# Patient Record
Sex: FEMALE | Race: WHITE | Employment: OTHER | URBAN - METROPOLITAN AREA
[De-identification: names, ages, dates, MRNs, and addresses within clinical notes are randomized per-mention and may not be internally consistent; named-entity substitution may affect disease eponyms.]

---

## 2017-07-17 ENCOUNTER — HOSPITAL ENCOUNTER (OUTPATIENT)
Dept: WOUND CARE | Age: 67
Discharge: HOME | End: 2017-07-17
Payer: MEDICARE

## 2017-07-17 PROCEDURE — 29580 STRAPPING UNNA BOOT: CPT

## 2017-07-21 PROCEDURE — 29580 STRAPPING UNNA BOOT: CPT

## 2017-07-28 PROCEDURE — 97602 WOUND(S) CARE NON-SELECTIVE: CPT

## 2017-07-28 PROCEDURE — 29580 STRAPPING UNNA BOOT: CPT

## 2017-08-04 ENCOUNTER — HOSPITAL ENCOUNTER (OUTPATIENT)
Dept: WOUND CARE | Age: 67
Discharge: HOME | End: 2017-08-04
Payer: MEDICARE

## 2017-08-04 PROCEDURE — 29580 STRAPPING UNNA BOOT: CPT

## 2017-08-11 PROCEDURE — 29580 STRAPPING UNNA BOOT: CPT

## 2017-08-18 PROCEDURE — 29580 STRAPPING UNNA BOOT: CPT

## 2017-08-24 PROCEDURE — 97602 WOUND(S) CARE NON-SELECTIVE: CPT

## 2017-09-01 ENCOUNTER — HOSPITAL ENCOUNTER (OUTPATIENT)
Dept: WOUND CARE | Age: 67
Discharge: HOME | End: 2017-09-01
Payer: MEDICARE

## 2017-09-01 PROCEDURE — 97602 WOUND(S) CARE NON-SELECTIVE: CPT

## 2017-09-08 PROCEDURE — 29580 STRAPPING UNNA BOOT: CPT

## 2017-09-11 PROCEDURE — 29580 STRAPPING UNNA BOOT: CPT

## 2017-09-15 PROCEDURE — 29580 STRAPPING UNNA BOOT: CPT

## 2017-09-18 PROCEDURE — 29580 STRAPPING UNNA BOOT: CPT

## 2017-09-22 PROCEDURE — 29580 STRAPPING UNNA BOOT: CPT

## 2017-09-26 ENCOUNTER — OFFICE VISIT (OUTPATIENT)
Dept: VASCULAR SURGERY | Age: 67
End: 2017-09-26

## 2017-09-26 VITALS
WEIGHT: 293 LBS | SYSTOLIC BLOOD PRESSURE: 140 MMHG | DIASTOLIC BLOOD PRESSURE: 70 MMHG | HEART RATE: 88 BPM | HEIGHT: 62 IN | BODY MASS INDEX: 53.92 KG/M2 | RESPIRATION RATE: 22 BRPM

## 2017-09-26 DIAGNOSIS — M79.89 LEG SWELLING: ICD-10-CM

## 2017-09-26 DIAGNOSIS — E66.01 MORBID OBESITY, UNSPECIFIED OBESITY TYPE (HCC): Primary | ICD-10-CM

## 2017-09-26 DIAGNOSIS — I87.2 VENOUS REFLUX: ICD-10-CM

## 2017-09-26 PROCEDURE — 29580 STRAPPING UNNA BOOT: CPT

## 2017-09-26 RX ORDER — MAGNESIUM 200 MG
1000 TABLET ORAL DAILY
COMMUNITY

## 2017-09-26 RX ORDER — WARFARIN 6 MG/1
6 TABLET ORAL
Status: ON HOLD | COMMUNITY
Start: 2017-05-31 | End: 2017-10-31

## 2017-09-26 RX ORDER — CHOLECALCIFEROL (VITAMIN D3) 125 MCG
2000 CAPSULE ORAL
COMMUNITY

## 2017-09-26 RX ORDER — ALBUTEROL SULFATE 90 UG/1
AEROSOL, METERED RESPIRATORY (INHALATION)
COMMUNITY
Start: 2016-01-25

## 2017-09-26 RX ORDER — METOPROLOL SUCCINATE 25 MG/1
25 TABLET, EXTENDED RELEASE ORAL 2 TIMES DAILY
COMMUNITY
Start: 2016-01-25

## 2017-09-26 RX ORDER — ESCITALOPRAM OXALATE 10 MG/1
TABLET ORAL
COMMUNITY
Start: 2017-07-30

## 2017-09-26 RX ORDER — LANOLIN ALCOHOL/MO/W.PET/CERES
325 CREAM (GRAM) TOPICAL
COMMUNITY
Start: 2016-04-22

## 2017-09-26 NOTE — MR AVS SNAPSHOT
Visit Information Date & Time Provider Department Dept. Phone Encounter #  
 9/26/2017 10:45 AM Katherine Saunders MD [facility] [address]  [id number] Your Appointments 10/18/2017 10:00 AM  
PROCEDURE with [facility] 1 [facility] ([address]) Appt Note: Suhail Reflux Jacky Galvan [place] [address]  
[phone] Padmini Ryan [address]  
  
    
 10/25/2017 10:15 AM  
Follow Up with Katherine Saunders MD  
BS [facility] THE [facility] ([address]) Appt Note: 1 month FU with [address] [place] [address] Upcoming Health Maintenance Date Due Hepatitis C Screening 1950 DTaP/Tdap/Td series (1 - Tdap) 10/29/1970 BREAST CANCER SCRN MAMMOGRAM 10/29/1999 FOBT Q 1 YEAR AGE 50-75 10/29/1999 ZOSTER VACCINE AGE 60> 8/29/2009 GLAUCOMA SCREENING Q2Y 10/29/2014 OSTEOPOROSIS SCREENING (DEXA) 10/29/2014 Pneumococcal 65+ Low/Medium Risk (1 of 2 - PCV13) 10/29/2014 MEDICARE YEARLY EXAM 10/29/2014 INFLUENZA AGE 9 TO ADULT 8/1/2017 Allergies as of 9/26/2017  Review Complete On: 9/26/2017 By: Helene Akhtar RN No Known Allergies Current Immunizations  Never Reviewed No immunizations on file. Not reviewed this visit You Were Diagnosed With   
  
 Codes Comments Morbid obesity, unspecified [facility] type ([facility]    -  Primary ICD-10-CM: E66.01 
ICD-9-CM: 278.01 Venous reflux     ICD-10-CM: I87.2 ICD-9-CM: 459.81 Leg swelling     ICD-10-CM: M79.89 ICD-9-CM: 729.81 Vitals BP Pulse Resp Height(growth percentile) Weight(growth percentile) BMI  
 140/70 (BP 1 Location: Left arm, BP Patient Position: Sitting) 88 22 5' 2\" (1.575 m) 312 lb (141.5 kg) 57.07 kg/m2 OB Status Smoking Status Postmenopausal Never Smoker BMI and BSA Data Body Mass Index Body Surface Area 57.07 kg/m 2 2.49 m 2 Your Updated Medication List  
  
   
This list is accurate as of: 9/26/17 11:39 AM.  Always use your most recent med list.  
  
  
  
  
 cholecalciferol (vitamin D3) 2,000 unit Tab Take 2,000 Units by mouth. COUMADIN 6 mg tablet Generic drug:  warfarin Take 6 mg by mouth.  
  
 escitalopram oxalate 10 mg tablet Commonly known as:  Alok Orts TAKE 1 TABLET (10 MG TOTAL) BY MOUTH DAILY. ferrous sulfate 325 mg (65 mg iron) tablet Take 325 mg by mouth. TOPROL XL 25 mg XL tablet Generic drug:  metoprolol succinate Take 1 Tab by mouth. VENTOLIN HFA 90 mcg/actuation inhaler Generic drug:  albuterol VENTOLIN  (90 Base) MCG/ACT AERS  
  
 VITAMIN B-12 1,000 mcg sublingual tablet Generic drug:  cyanocobalamin Take 1,000 mcg by mouth daily. To-Do List   
 10/10/2017 Imaging:  DUPLEX LOWER EXT VENOUS BILAT AMB Please provide this summary of care documentation to your next provider. Your primary care clinician is listed as Sadaf Steret. If you have any questions after today's visit, please call [phone].

## 2017-09-26 NOTE — PROGRESS NOTES
Steve Alfred    Chief Complaint   Patient presents with    Swelling    Leg Pain       History and Physical    Ms. Malcolm Holly is a 78year old female referred to our office for evaluation of her left leg ulcer. She was being seen in the wound care office and her wound subsequently healed, but during her last visit there was concern about her arterial perfusion. She states that she developed the ulcer on her leg after she broke her skin from scratching. She has never worn compression, although wound care has been placing her in Unna boots for compression. She admits to bilateral leg swelling and has been dealing with cellulitis in her right leg for some time. She also has a history of a DVT but is not quite sure which leg it was in. She states now her left leg wound is healed. Past Medical History:   Diagnosis Date    Depression     Hypertension     Thromboembolus [facility])      Patient Active Problem List   Diagnosis Code    Morbid obesity ([facility] E66.01    Venous reflux I87.2    Leg swelling M79.89     Past Surgical History:   Procedure Laterality Date    ABDOMEN SURGERY PROC UNLISTED      HX CHOLECYSTECTOMY      HX ORTHOPAEDIC       Current Outpatient Prescriptions   Medication Sig Dispense Refill    warfarin (COUMADIN) 6 mg tablet Take 6 mg by mouth.  escitalopram oxalate (LEXAPRO) 10 mg tablet TAKE 1 TABLET (10 MG TOTAL) BY MOUTH DAILY.  ferrous sulfate 325 mg (65 mg iron) tablet Take 325 mg by mouth.  metoprolol succinate (TOPROL XL) 25 mg XL tablet Take 1 Tab by mouth.  cholecalciferol, vitamin D3, 2,000 unit tab Take 2,000 Units by mouth.  albuterol (VENTOLIN HFA) 90 mcg/actuation inhaler VENTOLIN  (90 Base) MCG/ACT AERS      cyanocobalamin (VITAMIN B-12) 1,000 mcg sublingual tablet Take 1,000 mcg by mouth daily.        No Known Allergies  Social History     Social History    Marital status:      Spouse name: N/A    Number of children: N/A    Years of education: N/A     Occupational History    Not on file. Social History Main Topics    Smoking status: Never Smoker    Smokeless tobacco: Never Used    Alcohol use Yes    Drug use: No    Sexual activity: Not Currently     Other Topics Concern    Not on file     Social History Narrative    No narrative on file      Family History   Problem Relation Age of Onset    Diabetes Mother     Hypertension Mother     Cancer Father     Heart Disease Father        Review of Systems    Review of Systems   Constitutional: Negative for chills, diaphoresis, fever, malaise/fatigue and weight loss. HENT: Negative for hearing loss and sore throat. Eyes: Negative for blurred vision, photophobia and redness. Respiratory: Negative for cough, hemoptysis, shortness of breath and wheezing. Cardiovascular: Positive for leg swelling. Negative for chest pain, palpitations and orthopnea. Gastrointestinal: Negative for abdominal pain, blood in stool, constipation, diarrhea, heartburn, nausea and vomiting. Genitourinary: Negative for dysuria, frequency, hematuria and urgency. Musculoskeletal: Negative for back pain and myalgias. Skin: Positive for itching. Negative for rash. Neurological: Negative for dizziness, speech change, focal weakness, weakness and headaches. Endo/Heme/Allergies: Does not bruise/bleed easily. Psychiatric/Behavioral: Negative for depression and suicidal ideas.             Physical Exam:    Visit Vitals    /70 (BP 1 Location: Left arm, BP Patient Position: Sitting)    Pulse 88    Resp 22    Ht 5' 2\" (1.575 m)    Wt 312 lb (141.5 kg)    BMI 57.07 kg/m2      Physical Examination: General appearance - alert, well appearing, and in no distress  Mental status - alert, oriented to person, place, and time  Eyes - sclera anicteric, left eye normal, right eye normal  Ears - right ear normal, left ear normal  Nose - normal and patent, no erythema, discharge or polyps  Mouth - mucous membranes moist, pharynx normal without lesions  Neck - supple, no significant adenopathy  Lymphatics - no palpable lymphadenopathy  Chest - clear to auscultation, no wheezes, rales or rhonchi, symmetric air entry  Heart - normal rate and regular rhythm  Abdomen - Obese, soft, non tender  Extremities - 2+ pitting edema RLE.  + cellulitis. Left leg without any visible ulcers. 1+ pitting edema. Impression and Plan:    ICD-10-CM ICD-9-CM    1. Morbid obesity, unspecified obesity type ([facility] E66.01 278.01 DUPLEX LOWER EXT VENOUS BILAT AMB   2. Venous reflux I87.2 459.81 DUPLEX LOWER EXT VENOUS BILAT AMB   3. Leg swelling M79.89 729.81 DUPLEX LOWER EXT VENOUS BILAT AMB     Orders Placed This Encounter    DUPLEX LOWER EXT VENOUS BILAT AMB (Reflux)    warfarin (COUMADIN) 6 mg tablet    escitalopram oxalate (LEXAPRO) 10 mg tablet    ferrous sulfate 325 mg (65 mg iron) tablet    metoprolol succinate (TOPROL XL) 25 mg XL tablet    cholecalciferol, vitamin D3, 2,000 unit tab    albuterol (VENTOLIN HFA) 90 mcg/actuation inhaler    cyanocobalamin (VITAMIN B-12) 1,000 mcg sublingual tablet     I told Ms. Noman Ernst that she most likely has a combination of lymphedema and venous reflux. Both of these issues are worsened by her pannus compressing her IVC and decreasing her venous return by increasing her venous pressure. I explained that without weight loss, her leg issues will be a life long problem. We will study her for venous reflux and have recommended compression stockings. Ms. Noman Ernst has agreed and will see us in 3 weeks to review her results and to discuss her treatment options. Follow-up Disposition:  Return in about 3 weeks (around 10/17/2017) for Vascular labs. The treatment plan was reviewed with the patient in detail. The patient voiced understanding of this plan and all questions and concerns were addressed. The patient agrees with this plan.   We discussed the signs and symptoms that would require earlier attention or intervention. The patient was given educational material related to his/her visit and the patient has voiced understanding of the material.     I appreciate the opportunity to participate in the care of your patient. I will be sure to keep you informed of any subsequent changes in the treatment plan. If you have any questions or concerns, please feel free to contact me. Stephanie Robison MD    PLEASE NOTE:  This document has been produced using voice recognition software. Unrecognized errors in transcription may be present.

## 2017-09-29 PROCEDURE — 29580 STRAPPING UNNA BOOT: CPT

## 2017-10-03 ENCOUNTER — HOSPITAL ENCOUNTER (OUTPATIENT)
Dept: WOUND CARE | Age: 67
Discharge: HOME | End: 2017-10-03
Payer: MEDICARE

## 2017-10-03 PROCEDURE — 29580 STRAPPING UNNA BOOT: CPT

## 2017-10-06 PROCEDURE — 97602 WOUND(S) CARE NON-SELECTIVE: CPT

## 2017-10-16 PROCEDURE — 29580 STRAPPING UNNA BOOT: CPT

## 2017-10-18 ENCOUNTER — OFFICE VISIT (OUTPATIENT)
Dept: VASCULAR SURGERY | Age: 67
End: 2017-10-18

## 2017-10-18 DIAGNOSIS — E66.01 MORBID OBESITY, UNSPECIFIED OBESITY TYPE (HCC): ICD-10-CM

## 2017-10-18 DIAGNOSIS — M79.89 LEG SWELLING: ICD-10-CM

## 2017-10-18 DIAGNOSIS — I87.2 VENOUS REFLUX: ICD-10-CM

## 2017-10-18 NOTE — PROCEDURES
[facility]   *** FINAL REPORT ***    Name: Dorothey Gilford  MRN: [id number]       Outpatient  : 29 Oct 1950  HIS Order #: [id number]  [address] [facility] Visit #: [id number]  Date: 18 Oct 2017    TYPE OF TEST: Peripheral Venous Testing    REASON FOR TEST  Edema    Right Leg:-  Deep venous thrombosis:           No  Superficial venous thrombosis:    No  Deep venous insufficiency:        Yes  Superficial venous insufficiency: No    Left Leg:-  Deep venous thrombosis:           No  Superficial venous thrombosis:    No  Deep venous insufficiency:        No  Superficial venous insufficiency: No      INTERPRETATION/FINDINGS  Duplex images were obtained using 2-D gray scale, color flow and  spectral doppler analysis. Technically difficult exam -morbidly obese. Could not position manual  bed in reverse trendelenberg due to patients weight. 1. No evidence of deep vein thrombosis in the common femoral proximal  deep femoral, femoral, popliteal and posterior tibial veins. 2. Peroneal veins not visualized bilaterally. Right popliteal vein  patent by doppler and colorflow, poorly visualized in gray scale. Distal  femoral veins poorly visualized. 3. Deep venous reflux in the right common femoral vein of 2.55  seconds. 4. No evidence of great saphenous vein reflux bilaterally from the  sapheno-femoral junction to proximal calf. 5. No evidence of small saphenous vein reflux bilaterally in the  proximal, mid and distal segments. 6. Interstitial edema identified in both calves. 7. Triphasic posterior tibial artery flow bilaterally. ADDITIONAL COMMENTS    I have personally reviewed the data relevant to the interpretation of  this  study. TECHNOLOGIST: Armida Munguia RVT, RDMS  Signed: 10/18/2017 02:22 PM    PHYSICIAN: Carloz Campa.  Elizabeth Chaudhary MD  Signed: 10/18/2017 02:42 PM

## 2017-10-19 PROCEDURE — 97602 WOUND(S) CARE NON-SELECTIVE: CPT

## 2017-10-23 PROCEDURE — 97602 WOUND(S) CARE NON-SELECTIVE: CPT

## 2017-10-25 ENCOUNTER — OFFICE VISIT (OUTPATIENT)
Dept: VASCULAR SURGERY | Age: 67
End: 2017-10-25

## 2017-10-25 VITALS
SYSTOLIC BLOOD PRESSURE: 136 MMHG | WEIGHT: 293 LBS | HEART RATE: 90 BPM | BODY MASS INDEX: 53.92 KG/M2 | RESPIRATION RATE: 18 BRPM | DIASTOLIC BLOOD PRESSURE: 80 MMHG | HEIGHT: 62 IN

## 2017-10-25 DIAGNOSIS — I89.0 LYMPHEDEMA: ICD-10-CM

## 2017-10-25 DIAGNOSIS — E66.01 MORBID OBESITY (HCC): Primary | ICD-10-CM

## 2017-10-25 DIAGNOSIS — M79.89 LEG SWELLING: ICD-10-CM

## 2017-10-25 NOTE — MR AVS SNAPSHOT
Visit Information Date & Time Provider Department Dept. Phone Encounter #  
 10/25/2017 10:15 AM Tiki Clark MD [facility] [address] [id number] Your Appointments 11/28/2017  9:00 AM  
Follow Up with Tiki Clark MD  
[facility] Appt Note: 1 month FU no studies [address] [place] [address] Upcoming Health Maintenance Date Due Hepatitis C Screening 1950 DTaP/Tdap/Td series (1 - Tdap) 10/29/1970 BREAST CANCER SCRN MAMMOGRAM 10/29/1999 FOBT Q 1 YEAR AGE 50-75 10/29/1999 ZOSTER VACCINE AGE 60> 8/29/2009 GLAUCOMA SCREENING Q2Y 10/29/2014 OSTEOPOROSIS SCREENING (DEXA) 10/29/2014 Pneumococcal 65+ Low/Medium Risk (1 of 2 - PCV13) 10/29/2014 MEDICARE YEARLY EXAM 10/29/2014 INFLUENZA AGE 9 TO ADULT 8/1/2017 Allergies as of 10/25/2017  Review Complete On: 10/25/2017 By: Morteza Johnson RN No Known Allergies Current Immunizations  Never Reviewed No immunizations on file. Not reviewed this visit Vitals BP Pulse Resp Height(growth percentile) Weight(growth percentile) BMI  
 136/80 (BP 1 Location: Right arm, BP Patient Position: Sitting) 90 18 5' 2\" (1.575 m) 312 lb (141.5 kg) 57.07 kg/m2 OB Status Smoking Status Postmenopausal Never Smoker BMI and BSA Data Body Mass Index Body Surface Area 57.07 kg/m 2 2.49 m 2 Your Updated Medication List  
  
   
This list is accurate as of: 10/25/17 10:56 AM.  Always use your most recent med list.  
  
  
  
  
 cholecalciferol (vitamin D3) 2,000 unit Tab Take 2,000 Units by mouth. COUMADIN 6 mg tablet Generic drug:  warfarin Take 6 mg by mouth.  
  
 escitalopram oxalate 10 mg tablet Commonly known as:  Skip  TAKE 1 TABLET (10 MG TOTAL) BY MOUTH DAILY. ferrous sulfate 325 mg (65 mg iron) tablet Take 325 mg by mouth. TOPROL XL 25 mg XL tablet Generic drug:  metoprolol succinate Take 1 Tab by mouth. VENTOLIN HFA 90 mcg/actuation inhaler Generic drug:  albuterol VENTOLIN  (90 Base) MCG/ACT AERS  
  
 VITAMIN B-12 1,000 mcg sublingual tablet Generic drug:  cyanocobalamin Take 1,000 mcg by mouth daily. To-Do List   
 11/02/2017 9:00 AM  
  Appointment with WOUND NURSE at [facility] ([phone]) Please provide this summary of care documentation to your next provider. Your primary care clinician is listed as Nerissa Narvaez. If you have any questions after today's visit, please call [phone].

## 2017-10-26 NOTE — PROGRESS NOTES
Lisa Nurse    Chief Complaint   Patient presents with    Leg Pain    Swelling       History and Physical    Ms. Nunu Chowdhury returns to our office for follow up of her bilateral leg swelling and possible venous reflux disease. She states that she is continuing to struggle with her wound by her knee and is scheduled to see Dr. Tiff Romo in the wound care office this Friday. She denies any fevers or chills, new wounds or episodes of cellulitis. Past Medical History:   Diagnosis Date    Depression     Hypertension     Thromboembolus [facility])      Patient Active Problem List   Diagnosis Code    Morbid obesity ([facility] E66.01    Venous reflux I87.2    Leg swelling M79.89     Past Surgical History:   Procedure Laterality Date    ABDOMEN SURGERY PROC UNLISTED      HX CHOLECYSTECTOMY      HX ORTHOPAEDIC       Current Outpatient Prescriptions   Medication Sig Dispense Refill    warfarin (COUMADIN) 6 mg tablet Take 6 mg by mouth.  escitalopram oxalate (LEXAPRO) 10 mg tablet TAKE 1 TABLET (10 MG TOTAL) BY MOUTH DAILY.  ferrous sulfate 325 mg (65 mg iron) tablet Take 325 mg by mouth.  metoprolol succinate (TOPROL XL) 25 mg XL tablet Take 1 Tab by mouth.  cholecalciferol, vitamin D3, 2,000 unit tab Take 2,000 Units by mouth.  albuterol (VENTOLIN HFA) 90 mcg/actuation inhaler VENTOLIN  (90 Base) MCG/ACT AERS      cyanocobalamin (VITAMIN B-12) 1,000 mcg sublingual tablet Take 1,000 mcg by mouth daily. No Known Allergies  Social History     Social History    Marital status:      Spouse name: N/A    Number of children: N/A    Years of education: N/A     Occupational History    Not on file.      Social History Main Topics    Smoking status: Never Smoker    Smokeless tobacco: Never Used    Alcohol use Yes    Drug use: No    Sexual activity: Not Currently     Other Topics Concern    Not on file     Social History Narrative      Family History   Problem Relation Age of Onset    Diabetes Mother     Hypertension Mother     Cancer Father     Heart Disease Father        Review of Systems    Review of Systems   Constitutional: Negative for chills, diaphoresis, fever, malaise/fatigue and weight loss. HENT: Negative for hearing loss and sore throat. Eyes: Negative for blurred vision, photophobia and redness. Respiratory: Negative for cough, hemoptysis, shortness of breath and wheezing. Cardiovascular: Positive for leg swelling. Negative for chest pain, palpitations and orthopnea. Gastrointestinal: Negative for abdominal pain, blood in stool, constipation, diarrhea, heartburn, nausea and vomiting. Genitourinary: Negative for dysuria, frequency, hematuria and urgency. Musculoskeletal: Negative for back pain and myalgias. Skin: Negative for itching and rash. Neurological: Negative for dizziness, speech change, focal weakness, weakness and headaches. Endo/Heme/Allergies: Does not bruise/bleed easily. Psychiatric/Behavioral: Negative for depression and suicidal ideas. Physical Exam:    Visit Vitals    /80 (BP 1 Location: Right arm, BP Patient Position: Sitting)    Pulse 90    Resp 18    Ht 5' 2\" (1.575 m)    Wt 312 lb (141.5 kg)    BMI 57.07 kg/m2      Physical Examination: General appearance - alert, well appearing, and in no distress. Morbidly obese  Mental status - alert, oriented to person, place, and time  Eyes - sclera anicteric, left eye normal, right eye normal  Ears - right ear normal, left ear normal  Nose - normal and patent, no erythema, discharge or polyps  Mouth - mucous membranes moist, pharynx normal without lesions  Neck - supple, no significant adenopathy  Extremities - Left knee in heavy bandage. Left lower leg with Unna boot. Right leg with persistent lymphedema. No active cellulitis      Impression and Plan:    ICD-10-CM ICD-9-CM    1. Morbid obesity ([facility] E66.01 278.01    2. Leg swelling M79.89 729.81    3.  Lymphedema I89.0 457.1      I reviewed Ms. Weathers's venous reflux study and this is negative. I explained that she has lymphedema as her primary cause of her leg swelling and episodes of cellulitis and wounds. I believe her biggest issue is her weight and we discussed this. Ms. Vijay Anderson needs to wear bilateral compression stockings for the rest of her life, and would benefit from lymphedema therapy. However, this may have to wait until her knee wound is healed. Additionally, I think that the Unna boot stopping at her mid leg may lead to increased swelling at her knee which may impede her wound healing. We will re evaluate Ms. Vijay Anderson again in a month and try to get her on a regimen to keep her edema under control. Follow-up Disposition:  Return in about 4 weeks (around 11/22/2017) for Symptom check. The treatment plan was reviewed with the patient in detail. The patient voiced understanding of this plan and all questions and concerns were addressed. The patient agrees with this plan. We discussed the signs and symptoms that would require earlier attention or intervention. The patient was given educational material related to his/her visit and the patient has voiced understanding of the material.     I appreciate the opportunity to participate in the care of your patient. I will be sure to keep you informed of any subsequent changes in the treatment plan. If you have any questions or concerns, please feel free to contact me. Sahil Melton MD    PLEASE NOTE:  This document has been produced using voice recognition software. Unrecognized errors in transcription may be present.

## 2017-10-27 ENCOUNTER — HOSPITAL ENCOUNTER (INPATIENT)
Age: 67
LOS: 3 days | Discharge: HOME | DRG: 603 | End: 2017-10-31
Attending: FAMILY MEDICINE | Admitting: HOSPITALIST
Payer: MEDICARE

## 2017-10-27 ENCOUNTER — APPOINTMENT (OUTPATIENT)
Dept: GENERAL RADIOLOGY | Age: 67
DRG: 603 | End: 2017-10-27
Attending: FAMILY MEDICINE
Payer: MEDICARE

## 2017-10-27 DIAGNOSIS — L03.116 CELLULITIS OF LEFT KNEE: Primary | ICD-10-CM

## 2017-10-27 PROBLEM — T14.8XXA NONHEALING NONSURGICAL WOUND: Status: ACTIVE | Noted: 2017-10-27

## 2017-10-27 PROBLEM — T14.8XXA NONHEALING NONSURGICAL WOUND: Status: RESOLVED | Noted: 2017-10-27 | Resolved: 2017-10-27

## 2017-10-27 PROBLEM — Z79.01 ANTICOAGULATED ON COUMADIN: Status: ACTIVE | Noted: 2017-10-27

## 2017-10-27 PROBLEM — L02.416 ABSCESS OF KNEE, LEFT: Status: ACTIVE | Noted: 2017-10-27

## 2017-10-27 PROBLEM — Z86.711 HISTORY OF PULMONARY EMBOLISM: Status: ACTIVE | Noted: 2017-10-27

## 2017-10-27 LAB
ANION GAP SERPL CALC-SCNC: 9 MMOL/L (ref 3–18)
BASOPHILS # BLD: 0 K/UL (ref 0–0.06)
BASOPHILS NFR BLD: 1 % (ref 0–2)
BUN SERPL-MCNC: 11 MG/DL (ref 7–18)
BUN/CREAT SERPL: 13 (ref 12–20)
CALCIUM SERPL-MCNC: 8.9 MG/DL (ref 8.5–10.1)
CHLORIDE SERPL-SCNC: 104 MMOL/L (ref 100–108)
CO2 SERPL-SCNC: 26 MMOL/L (ref 21–32)
CREAT SERPL-MCNC: 0.82 MG/DL (ref 0.6–1.3)
DIFFERENTIAL METHOD BLD: ABNORMAL
EOSINOPHIL # BLD: 0.2 K/UL (ref 0–0.4)
EOSINOPHIL NFR BLD: 3 % (ref 0–5)
ERYTHROCYTE [DISTWIDTH] IN BLOOD BY AUTOMATED COUNT: 16.3 % (ref 11.6–14.5)
GLUCOSE SERPL-MCNC: 105 MG/DL (ref 74–99)
HCT VFR BLD AUTO: 36.9 % (ref 35–45)
HGB BLD-MCNC: 11.5 G/DL (ref 12–16)
INR PPP: 2.4 (ref 0.8–1.2)
LYMPHOCYTES # BLD: 1.4 K/UL (ref 0.9–3.6)
LYMPHOCYTES NFR BLD: 23 % (ref 21–52)
MCH RBC QN AUTO: 26 PG (ref 24–34)
MCHC RBC AUTO-ENTMCNC: 31.2 G/DL (ref 31–37)
MCV RBC AUTO: 83.5 FL (ref 74–97)
MONOCYTES # BLD: 0.4 K/UL (ref 0.05–1.2)
MONOCYTES NFR BLD: 6 % (ref 3–10)
NEUTS SEG # BLD: 4 K/UL (ref 1.8–8)
NEUTS SEG NFR BLD: 67 % (ref 40–73)
PLATELET # BLD AUTO: 255 K/UL (ref 135–420)
PMV BLD AUTO: 9.8 FL (ref 9.2–11.8)
POTASSIUM SERPL-SCNC: 4.4 MMOL/L (ref 3.5–5.5)
PROTHROMBIN TIME: 25.2 SEC (ref 11.5–15.2)
RBC # BLD AUTO: 4.42 M/UL (ref 4.2–5.3)
SODIUM SERPL-SCNC: 139 MMOL/L (ref 136–145)
WBC # BLD AUTO: 6.1 K/UL (ref 4.6–13.2)

## 2017-10-27 PROCEDURE — 74011250636 HC RX REV CODE- 250/636: Performed by: FAMILY MEDICINE

## 2017-10-27 PROCEDURE — 85025 COMPLETE CBC W/AUTO DIFF WBC: CPT | Performed by: FAMILY MEDICINE

## 2017-10-27 PROCEDURE — 99283 EMERGENCY DEPT VISIT LOW MDM: CPT

## 2017-10-27 PROCEDURE — 87186 SC STD MICRODIL/AGAR DIL: CPT | Performed by: PODIATRIST

## 2017-10-27 PROCEDURE — 80048 BASIC METABOLIC PNL TOTAL CA: CPT | Performed by: FAMILY MEDICINE

## 2017-10-27 PROCEDURE — L3670 SO ACRO/CLAV CAN WEB PRE OTS: HCPCS

## 2017-10-27 PROCEDURE — 74011250636 HC RX REV CODE- 250/636: Performed by: EMERGENCY MEDICINE

## 2017-10-27 PROCEDURE — 87040 BLOOD CULTURE FOR BACTERIA: CPT | Performed by: FAMILY MEDICINE

## 2017-10-27 PROCEDURE — 74011250636 HC RX REV CODE- 250/636: Performed by: PHYSICIAN ASSISTANT

## 2017-10-27 PROCEDURE — 96368 THER/DIAG CONCURRENT INF: CPT

## 2017-10-27 PROCEDURE — 87077 CULTURE AEROBIC IDENTIFY: CPT | Performed by: PODIATRIST

## 2017-10-27 PROCEDURE — 87070 CULTURE OTHR SPECIMN AEROBIC: CPT | Performed by: PODIATRIST

## 2017-10-27 PROCEDURE — 97602 WOUND(S) CARE NON-SELECTIVE: CPT

## 2017-10-27 PROCEDURE — 87075 CULTR BACTERIA EXCEPT BLOOD: CPT | Performed by: PODIATRIST

## 2017-10-27 PROCEDURE — 74011000258 HC RX REV CODE- 258: Performed by: EMERGENCY MEDICINE

## 2017-10-27 PROCEDURE — 87076 CULTURE ANAEROBE IDENT EACH: CPT | Performed by: PODIATRIST

## 2017-10-27 PROCEDURE — 74011000250 HC RX REV CODE- 250: Performed by: PHYSICIAN ASSISTANT

## 2017-10-27 PROCEDURE — 74011000258 HC RX REV CODE- 258: Performed by: FAMILY MEDICINE

## 2017-10-27 PROCEDURE — 77030011256 HC DRSG MEPILEX <16IN NO BORD MOLN -A

## 2017-10-27 PROCEDURE — 74011250637 HC RX REV CODE- 250/637: Performed by: PHYSICIAN ASSISTANT

## 2017-10-27 PROCEDURE — 96367 TX/PROPH/DG ADDL SEQ IV INF: CPT

## 2017-10-27 PROCEDURE — 73564 X-RAY EXAM KNEE 4 OR MORE: CPT

## 2017-10-27 PROCEDURE — 85610 PROTHROMBIN TIME: CPT | Performed by: EMERGENCY MEDICINE

## 2017-10-27 PROCEDURE — 74011000258 HC RX REV CODE- 258: Performed by: PHYSICIAN ASSISTANT

## 2017-10-27 PROCEDURE — 99218 HC RM OBSERVATION: CPT

## 2017-10-27 PROCEDURE — 96365 THER/PROPH/DIAG IV INF INIT: CPT

## 2017-10-27 RX ORDER — PIPERACILLIN SODIUM, TAZOBACTAM SODIUM 3; .375 G/15ML; G/15ML
3.38 INJECTION, POWDER, LYOPHILIZED, FOR SOLUTION INTRAVENOUS
Status: DISCONTINUED | OUTPATIENT
Start: 2017-10-27 | End: 2017-10-27 | Stop reason: CLARIF

## 2017-10-27 RX ORDER — MELATONIN
2000 DAILY
Status: DISCONTINUED | OUTPATIENT
Start: 2017-10-28 | End: 2017-10-31 | Stop reason: HOSPADM

## 2017-10-27 RX ORDER — WARFARIN 6 MG/1
6 TABLET ORAL ONCE
Status: COMPLETED | OUTPATIENT
Start: 2017-10-27 | End: 2017-10-27

## 2017-10-27 RX ORDER — SODIUM CHLORIDE 0.9 % (FLUSH) 0.9 %
5-10 SYRINGE (ML) INJECTION AS NEEDED
Status: DISCONTINUED | OUTPATIENT
Start: 2017-10-27 | End: 2017-10-31 | Stop reason: HOSPADM

## 2017-10-27 RX ORDER — SODIUM CHLORIDE 0.9 % (FLUSH) 0.9 %
5-10 SYRINGE (ML) INJECTION EVERY 8 HOURS
Status: DISCONTINUED | OUTPATIENT
Start: 2017-10-27 | End: 2017-10-31 | Stop reason: HOSPADM

## 2017-10-27 RX ORDER — HYDROCODONE BITARTRATE AND ACETAMINOPHEN 5; 325 MG/1; MG/1
1 TABLET ORAL
Status: DISCONTINUED | OUTPATIENT
Start: 2017-10-27 | End: 2017-10-31 | Stop reason: HOSPADM

## 2017-10-27 RX ORDER — LIDOCAINE HYDROCHLORIDE 20 MG/ML
INJECTION, SOLUTION INFILTRATION; PERINEURAL
Status: DISPENSED
Start: 2017-10-27 | End: 2017-10-28

## 2017-10-27 RX ORDER — LANOLIN ALCOHOL/MO/W.PET/CERES
1 CREAM (GRAM) TOPICAL
Status: DISCONTINUED | OUTPATIENT
Start: 2017-10-28 | End: 2017-10-31 | Stop reason: HOSPADM

## 2017-10-27 RX ORDER — LANOLIN ALCOHOL/MO/W.PET/CERES
1000 CREAM (GRAM) TOPICAL DAILY
Status: DISCONTINUED | OUTPATIENT
Start: 2017-10-28 | End: 2017-10-31 | Stop reason: HOSPADM

## 2017-10-27 RX ORDER — ESCITALOPRAM OXALATE 10 MG/1
10 TABLET ORAL DAILY
Status: DISCONTINUED | OUTPATIENT
Start: 2017-10-28 | End: 2017-10-31 | Stop reason: HOSPADM

## 2017-10-27 RX ORDER — SODIUM CHLORIDE 9 MG/ML
50 INJECTION, SOLUTION INTRAVENOUS CONTINUOUS
Status: DISCONTINUED | OUTPATIENT
Start: 2017-10-27 | End: 2017-10-31 | Stop reason: HOSPADM

## 2017-10-27 RX ORDER — VANCOMYCIN HYDROCHLORIDE
1250 ONCE
Status: COMPLETED | OUTPATIENT
Start: 2017-10-27 | End: 2017-10-28

## 2017-10-27 RX ORDER — ACETAMINOPHEN 325 MG/1
650 TABLET ORAL
Status: DISCONTINUED | OUTPATIENT
Start: 2017-10-27 | End: 2017-10-31 | Stop reason: HOSPADM

## 2017-10-27 RX ORDER — ALBUTEROL SULFATE 90 UG/1
1 AEROSOL, METERED RESPIRATORY (INHALATION)
Status: DISCONTINUED | OUTPATIENT
Start: 2017-10-27 | End: 2017-10-27 | Stop reason: SDUPTHER

## 2017-10-27 RX ORDER — WARFARIN 6 MG/1
6 TABLET ORAL DAILY
Status: DISCONTINUED | OUTPATIENT
Start: 2017-10-28 | End: 2017-10-27 | Stop reason: DRUGHIGH

## 2017-10-27 RX ORDER — ALBUTEROL SULFATE 90 UG/1
1 AEROSOL, METERED RESPIRATORY (INHALATION)
Status: DISCONTINUED | OUTPATIENT
Start: 2017-10-27 | End: 2017-10-31 | Stop reason: HOSPADM

## 2017-10-27 RX ORDER — METOPROLOL SUCCINATE 25 MG/1
25 TABLET, EXTENDED RELEASE ORAL DAILY
Status: DISCONTINUED | OUTPATIENT
Start: 2017-10-28 | End: 2017-10-31 | Stop reason: HOSPADM

## 2017-10-27 RX ADMIN — VANCOMYCIN HYDROCHLORIDE 1250 MG: 10 INJECTION, POWDER, LYOPHILIZED, FOR SOLUTION INTRAVENOUS at 20:45

## 2017-10-27 RX ADMIN — WARFARIN SODIUM 6 MG: 6 TABLET ORAL at 20:45

## 2017-10-27 RX ADMIN — Medication 10 ML: at 16:09

## 2017-10-27 RX ADMIN — CEFAZOLIN SODIUM 1 G: 1 INJECTION, POWDER, FOR SOLUTION INTRAMUSCULAR; INTRAVENOUS at 12:34

## 2017-10-27 RX ADMIN — SODIUM CHLORIDE 1000 MG: 900 INJECTION, SOLUTION INTRAVENOUS at 14:10

## 2017-10-27 RX ADMIN — SODIUM CHLORIDE 600 MG: 900 INJECTION, SOLUTION INTRAVENOUS at 18:00

## 2017-10-27 RX ADMIN — SODIUM CHLORIDE 1000 ML: 900 INJECTION, SOLUTION INTRAVENOUS at 12:34

## 2017-10-27 RX ADMIN — PIPERACILLIN SODIUM,TAZOBACTAM SODIUM 3.38 G: 3; .375 INJECTION, POWDER, FOR SOLUTION INTRAVENOUS at 14:04

## 2017-10-27 RX ADMIN — SODIUM CHLORIDE 50 ML/HR: 900 INJECTION, SOLUTION INTRAVENOUS at 16:15

## 2017-10-27 NOTE — ED TRIAGE NOTES
Patient was sent to ER from wound clinic upstairs to have left knee wound assessed for possible infection. Wound has small amount of exudate and tunneling noted. BLE are edematous with scaling of the skin. Wound cultures were obtained while in the wound clinic prior to arriving to ER. Labs drawn and blood cultures drawn by this nurse. Patient able to ambulate. Reports that she fell on her knee August 19.

## 2017-10-27 NOTE — Clinical Note
Status[de-identified] Inpatient [101] Type of Bed: Medical [8] Inpatient Hospitalization Certified Necessary for the Following Reasons: 3. Patient receiving treatment that can only be provided in an inpatient setting (further clarification in H&P documentation) Admitting Diagnosis: Cellulitis of left knee [[id number]] Admitting Physician: Caryn Arizmendi [[id number]] Attending Physician: Caryn Arizmendi [[id number]] Estimated Length of Stay: 2 Midnights Discharge Plan[de-identified] Home with Office Follow-up

## 2017-10-27 NOTE — PROGRESS NOTES
Pharmacy Dosing Services: Vancomycin    Consult for Vancomycin Dosing by Pharmacy by JENS Guzman  Consult provided for this 78y.o. year old female , for indication of skin and soft tissue infection. Day of Therapy 1    Ht Readings from Last 1 Encounters:   10/27/17 157.5 cm (62\")        Wt Readings from Last 1 Encounters:   10/27/17 136.1 kg (300 lb)      Other Current Antibiotics Clindamycin 600 mg IV q8h   Significant Cultures pending   Serum Creatinine Lab Results   Component Value Date/Time    Creatinine 0.82 10/27/2017 12:18 PM      Creatinine Clearance Estimated Creatinine Clearance: 88.8 mL/min (based on Cr of 0.82). BUN Lab Results   Component Value Date/Time    BUN 11 10/27/2017 12:18 PM      WBC Lab Results   Component Value Date/Time    WBC 6.1 10/27/2017 01:06 PM      H/H Lab Results   Component Value Date/Time    HGB 11.5 10/27/2017 01:06 PM      Platelets Lab Results   Component Value Date/Time    PLATELET 963 45/93/4679 01:06 PM      Temp 99 °F (37.2 °C)       Patient received initial dose in ED:  Vancomycin 1000 mg IV once, administered 10/27/17 at 14:10. Additional Vancomycin 1250 mg IV once, ordered for 10/27/17 at 20:00.  (for total loading dose of 2250 mg)  Follow with maintenance dose of Vancomycin 1750 mg IV every 18 hours, starting on 10/28/17 at ~ 14:00. Dose calculated to approximate a therapeutic trough of 10 -15 mcg/mL. Pharmacy to follow daily and will make changes to dose and/or frequency based on clinical status.   Pharmacist Clotilde Torres, [facility]

## 2017-10-27 NOTE — ED PROVIDER NOTES
[address]  EMERGENCY DEPARTMENT HISTORY AND PHYSICAL EXAM       Date: 10/27/2017   Patient Name: Tamiko Hernadez   YOB: 1950  Medical Record Number: [id number]    History of Presenting Illness     Chief Complaint   Patient presents with    Wound Check        History Provided By:  patient    Additional History: 12:02 PM  Tamiko Hernadez is a 78 y.o. female who presents to the emergency department from wound care escorted by a friend C/O gradually worsening left knee wound infection with pain, redness, and warmth onset 2.3 months ago after injury post fall (8/19/2017). Pt had it flushed out. Pt reports that she has been off of abx tx for a month. Associated sx include numbness and chills (unsure if its due to cold weather). Pt reports she had a few wound cultures done. Pt was unsure if she broke anything so she went to see Dr. Brenda Jackson even though XR showed no fracture. No hx of DM or HLD. Pt denies CP, SOB, fever, leg swelling, syncope, and any other associated signs or sx. Primary Care Provider: Samreen Crowley DO   Specialist:    Past History     Past Medical History:   Past Medical History:   Diagnosis Date    Depression     Hypertension     Thromboembolus [facility])         Past Surgical History:   Past Surgical History:   Procedure Laterality Date    ABDOMEN SURGERY PROC UNLISTED      HX CHOLECYSTECTOMY      HX ORTHOPAEDIC          Family History:   Family History   Problem Relation Age of Onset    Diabetes Mother     Hypertension Mother     Cancer Father     Heart Disease Father         Social History:   Social History   Substance Use Topics    Smoking status: Never Smoker    Smokeless tobacco: Never Used    Alcohol use Yes        Allergies:   No Known Allergies     Review of Systems   Review of Systems   Constitutional: Positive for chills. Respiratory: Negative for shortness of breath. Cardiovascular: Negative for chest pain. Musculoskeletal: Positive for arthralgias (knee pain, left). (-) Leg swelling     Skin: Positive for color change and wound (left knee). Neurological: Positive for numbness. Negative for syncope. All other systems reviewed and are negative. Physical Exam  Vitals:    10/27/17 1253 10/27/17 1259 10/27/17 1440   BP: 113/43  121/50   Pulse: 81  88   Resp: 19  15   Temp: 98.2 °F (36.8 °C)  98.5 °F (36.9 °C)   SpO2: 100% 100% 100%   Weight: 136.1 kg (300 lb)     Height: 5' 2\" (1.575 m)         Physical Exam   Nursing note and vitals reviewed. Vital signs and nursing notes reviewed    CONSTITUTIONAL: Alert, in no apparent distress; Morbidly obese, middle aged. HEAD:  Normocephalic, atraumatic  EYES: PERRL; EOM's intact. ENTM: Nose: no rhinorrhea; Throat: no erythema or exudate, mucous membranes moist  Neck:  No JVD, supple without lymphadenopathy  RESP: Chest clear, equal breath sounds. CV: S1 and S2 WNL; No murmurs, gallops or rubs. GI: Normal bowel sounds, abdomen soft and non-tender. No masses or organomegaly. : No costo-vertebral angle tenderness. BACK:  Non-tender  UPPER EXT:  Normal inspection  LOWER EXT: RLE: Chronic swelling with chronic edema with some scaling of the skin. LLE: Chronic swelling with chronic edema with some scaling of the skin with a 3 cm ulceration with some exudate that is deep and tunnels. Extensive area of erythema, induration, and warmth on the medial aspect of the wound on the left knee. Distal pulses intact. NEURO: CN intact, reflexes 2/4 and sym, strength 5/5 and sym, sensation intact. SKIN: No rashes; Normal for age and stage. PSYCH:  Alert and oriented, normal affect.     Diagnostic Study Results     Labs -      Recent Results (from the past 12 hour(s))   METABOLIC PANEL, BASIC    Collection Time: 10/27/17 12:18 PM   Result Value Ref Range    Sodium 139 136 - 145 mmol/L    Potassium 4.4 3.5 - 5.5 mmol/L    Chloride 104 100 - 108 mmol/L    CO2 26 21 - 32 mmol/L    Anion gap 9 3.0 - 18 mmol/L    Glucose 105 (H) 74 - 99 mg/dL    BUN 11 7.0 - 18 MG/DL    Creatinine 0.82 0.6 - 1.3 MG/DL    BUN/Creatinine ratio 13 12 - 20      GFR est AA >60 >60 ml/min/1.73m2    GFR est non-AA >60 >60 ml/min/1.73m2    Calcium 8.9 8.5 - 10.1 MG/DL   PROTHROMBIN TIME + INR    Collection Time: 10/27/17 12:18 PM   Result Value Ref Range    Prothrombin time 25.2 (H) 11.5 - 15.2 sec    INR 2.4 (H) 0.8 - 1.2         Radiologic Studies -  The following have been ordered and reviewed:  XR KNEE LT MIN 4 V   Final Result   IMPRESSION:     1. Anterior soft tissue irregularity and swelling suggestive of soft tissue  injury or infection anterior to the patellar tendon. No underlying foreign  bodies.     2. No acute osseous abnormality identified. Moderate tricompartment  osteoarthritis. As read by the radiologist.         Medical Decision Making   I am the first provider for this patient. I reviewed the vital signs, available nursing notes, past medical history, past surgical history, family history and social history. Vital Signs-Reviewed the patient's vital signs. Patient Vitals for the past 12 hrs:   Temp Pulse Resp BP SpO2   10/27/17 1440 98.5 °F (36.9 °C) 88 15 121/50 100 %   10/27/17 1259 - - - - 100 %   10/27/17 1253 98.2 °F (36.8 °C) 81 19 113/43 100 %       Pulse Oximetry Analysis - Normal 100% on room air. Old Medical Records: Nursing notes. Provider Notes:   INITIAL CLINICAL IMPRESSION and PLANS:  The patient presents with the primary complaint(s) of: knee pain and wound. The presentation, to include historical aspects and clinical findings are consistent with the DX of cellulitis. However, other possible DX's to consider as primary, associated with, or exacerbated by include:    Osteomyelitis    Considering the above, my initial management plan to evaluate and therapeutic interventions include the following and as noted in the orders:    1.   Labs: CBC, BMP  2.  Imaging: left knee XR     Procedures:   Procedures    ED Course:  12:02 AM  Initial assessment performed. The patients presenting problems have been discussed, and they are in agreement with the care plan formulated and outlined with them. I have encouraged them to ask questions as they arise throughout their visit. BEDSIDE TRANSFER OF CARE:  1:00 PM  Patient care will be transferred from Denney Apley, MD to Lisset Thapa MD.  Discussed available diagnostic results and care plan at length at beside. Patient and family made aware of provider change. All questions answered. Patient and family voiced understanding. Written by Orville Tirado, ED Scribe, as dictated by Denney Apley, MD.     1:47 PM Pt gives report that the redness was gradually worsening. Reaffirms cultures were done upstairs in this facility and had Betadine cleaning with Dr. Mariposa Vegas. Pt denies past MRSA or other infections and fevers. 1:53 PM Discussed patient's history, exam, and available diagnostics results with Zee Vaughn MD, podiatrist, who recommeds consult from Dr. Jeff Moscoso and admitting for IV abx as she has failed oral abx.    2:33 PM Discussed patient's history, exam, and available diagnostics results with Laura Springer MD, Orthopedics, who agree to consult. 2:47 PM Discussed patient's history, exam, and available diagnostics results with Constance Mejia MD, internal medicine, who agrees to admit pt to medical.    2:47 PM  Patient is being admitted to the hospital by Constance Mejia MD. The results of their tests and reasons for their admission have been discussed with them and/or available family. They convey agreement and understanding for the need to be admitted and for their admission diagnosis. CONDITIONS ON ADMISSION:  Deep Vein Thrombosis is not present at the time of admission. Thrombosis is not present at the time of admission. Urinary Tract Infection is not present at the time of admission.  Pneumonia is not present at the time of admission. MRSA is not present at the time of admission. Wound infection is present at the time of admission. Pressure Ulcer is not present at the time of admission. Medications Given in the ED:  Medications   vancomycin (VANCOCIN) 1,000 mg in 0.9% sodium chloride (MBP/ADV) 250 mL adv (1,000 mg IntraVENous New Bag 10/27/17 1410)   lidocaine (XYLOCAINE) 20 mg/mL (2 %) injection (not administered)   sodium chloride 0.9 % bolus infusion 1,000 mL (0 mL IntraVENous IV Completed 10/27/17 1423)   ceFAZolin (ANCEF) 1 g in 0.9% sodium chloride (MBP/ADV) 50 mL MBP (0 g IntraVENous IV Completed 10/27/17 1300)   piperacillin-tazobactam (ZOSYN) 3.375 g in 0.9% sodium chloride (MBP/ADV) 100 mL (3.375 g IntraVENous New Bag 10/27/17 1404)     Critical Care Time:   0 minutes      Diagnosis   Clinical Impression:   1. Cellulitis of left knee         Discussion:  Patient was stable in the ED. Labs unremarkable. Case discussed with Dr. Ame Dawn who recommends IV antibiotics and admission with Orthopedic consult. Patient failed outpatient antibiotic therapy. Patient was given Zosyn and Vancomycin IV. Case discussed with Orthopedics Dr. Lolly Benz who agreed to consult. Case discussed with Dr. Forrest Saunders who agreed with admission. _______________________________   Attestations: This note is prepared by Stephanie Mendoza, acting as a Scribe for Ran Vargas MD on 12:02 PM on 10/27/2017. Ran Vargas MD: The scribe's documentation has been prepared under my direction and personally reviewed by me in its entirety.   _______________________________

## 2017-10-27 NOTE — IP AVS SNAPSHOT
[address] [facility], [place], operated by [facility] 
 
 
 [address] [facility] [address] 
[phone] Patient: Elie Montero MRN: [id number] :1950 About your hospitalization You were admitted on:  2017 You last received care in the:  [address] [place] [address] You were discharged on:  2017 Why you were hospitalized Your primary diagnosis was:  Cellulitis Of Left Knee Your diagnoses also included:  Nonhealing Nonsurgical Wound, Cellulitis Of Left Lower Extremity, Anticoagulated On Coumadin, Abscess Of Knee, Left, History Of Pulmonary Embolism, Morbid Obesity (Hcc) Things You Need To Do (next 8 weeks) Call Ayden Higgins DO today For follow up regarding recent hospitalization - and further management of her coumadin Phone:  [phone] Where:  [address] [facility], [address] [facility] Follow up with Sosa Aguilar DPM  
For wound re-check, Phone:  [phone] [facility]  [facility] [address] [facility], [address] , [facility]  Follow up with Ayden Higgins DO Follow up appointment scheduled for 2017 at 3:40 p.m. with Christelle Felder NP Phone:  [phone] Where:  [address] [facility], [address]  WOUND CARE NURSE VISIT with WOUND NURSE at  9:00 AM  
Where:  THE [facility] OP WOUND CARE [facility]) Follow up with Mat Doyle Follow up appointment scheduled for 2017 at 9:00 a.m. Where:  Mat Doyle [address], [address] [phone]  Follow Up with Jimenez Hale MD at  9:00 AM  
Where:  BS Vein[facility] THE [facility] ([facility]) Discharge Orders None A check mauro indicates which time of day the medication should be taken. My Medications TAKE these medications as instructed Instructions Each Dose to Equal  
 Morning Noon Evening Bedtime  
 amoxicillin-clavulanate 875-125 mg per tablet Commonly known as:  AUGMENTIN Your last dose was: Your next dose is: Take 1 Tab by mouth two (2) times a day. 1 Tab  
    
   
   
   
  
 cholecalciferol (vitamin D3) 2,000 unit Tab Your last dose was: Your next dose is: Take 2,000 Units by mouth. 2000 Units  
    
   
   
   
  
 escitalopram oxalate 10 mg tablet Commonly known as:  Dong Rutledge Your last dose was: Your next dose is: TAKE 1 TABLET (10 MG TOTAL) BY MOUTH DAILY. ferrous sulfate 325 mg (65 mg iron) tablet Your last dose was: Your next dose is: Take 325 mg by mouth. 325 mg  
    
   
   
   
  
 TOPROL XL 25 mg XL tablet Generic drug:  metoprolol succinate Your last dose was: Your next dose is: Take 25 mg by mouth two (2) times a day. 25 mg VENTOLIN HFA 90 mcg/actuation inhaler Generic drug:  albuterol Your last dose was: Your next dose is:    
   
   
 VENTOLIN  (90 Base) MCG/ACT AERS  
     
   
   
   
  
 VITAMIN B-12 1,000 mcg sublingual tablet Generic drug:  cyanocobalamin Your last dose was: Your next dose is: Take 1,000 mcg by mouth daily. 1000 mcg  
    
   
   
   
  
 warfarin 3 mg tablet Commonly known as:  COUMADIN Your last dose was: Your next dose is: Take 1 Tab by mouth daily. 3 mg Where to Get Your Medications These medications were sent to [facility]/pharmacy #[id number]- [place], [address] [facility] Marley Casillas  [address], [address] [facility] [address] Phone:  [phone]  
  amoxicillin-clavulanate 875-125 mg per tablet  
 warfarin 3 mg tablet Discharge Instructions Skin Abscess: Care Instructions Your Care Instructions A skin abscess is a bacterial infection that forms a pocket of pus. A boil is a kind of skin abscess. The doctor may have cut an opening in the abscess so that the pus can drain out. You may have gauze in the cut so that the abscess will stay open and keep draining. You may need antibiotics. You will need to follow up with your doctor to make sure the infection has gone away. The doctor has checked you carefully, but problems can develop later. If you notice any problems or new symptoms, get medical treatment right away. Follow-up care is a key part of your treatment and safety. Be sure to make and go to all appointments, and call your doctor if you are having problems. It's also a good idea to know your test results and keep a list of the medicines you take. How can you care for yourself at home? · Apply warm and dry compresses, a heating pad set on low, or a hot water bottle 3 or 4 times a day for pain. Keep a cloth between the heat source and your skin. · If your doctor prescribed antibiotics, take them as directed. Do not stop taking them just because you feel better. You need to take the full course of antibiotics. · Take pain medicines exactly as directed. ¨ If the doctor gave you a prescription medicine for pain, take it as prescribed. ¨ If you are not taking a prescription pain medicine, ask your doctor if you can take an over-the-counter medicine. · Keep your bandage clean and dry. Change the bandage whenever it gets wet or dirty, or at least one time a day. · If the abscess was packed with gauze: 
¨ Keep follow-up appointments to have the gauze changed or removed. If the doctor instructed you to remove the gauze, gently pull out all of the gauze when your doctor tells you to. ¨ After the gauze is removed, soak the area in warm water for 15 to 20 minutes 2 times a day, until the wound closes. When should you call for help? Call your doctor now or seek immediate medical care if: 
? · You have signs of worsening infection, such as: 
¨ Increased pain, swelling, warmth, or redness. ¨ Red streaks leading from the infected skin. ¨ Pus draining from the wound. ¨ A fever. ? Watch closely for changes in your health, and be sure to contact your doctor if: 
? · You do not get better as expected. Where can you learn more? Go to http://bowen-grupo.info/. Enter N703 in the search box to learn more about \"Skin Abscess: Care Instructions. \" Current as of: October 13, 2016 Content Version: 11.4 © 2606-7732 "[employer]. Care instructions adapted under license by [employer] (which disclaims liability or warranty for this information). If you have questions about a medical condition or this instruction, always ask your healthcare professional. [place] [address] any warranty or liability for your use of this information. Cellulitis: Care Instructions Your Care Instructions Cellulitis is a skin infection. It often occurs after a break in the skin from a scrape, cut, bite, or puncture, or after a rash. The doctor has checked you carefully, but problems can develop later. If you notice any problems or new symptoms, get medical treatment right away. Follow-up care is a key part of your treatment and safety. Be sure to make and go to all appointments, and call your doctor if you are having problems. It's also a good idea to know your test results and keep a list of the medicines you take. How can you care for yourself at home? · Take your antibiotics as directed. Do not stop taking them just because you feel better. You need to take the full course of antibiotics. · Prop up the infected area on pillows to reduce pain and swelling. Try to keep the area above the level of your heart as often as you can. · If your doctor told you how to care for your wound, follow your doctor's instructions. If you did not get instructions, follow this general advice: ¨ Wash the wound with clean water 2 times a day. Don't use hydrogen peroxide or alcohol, which can slow healing. ¨ You may cover the wound with a thin layer of petroleum jelly, such as Vaseline, and a nonstick bandage. ¨ Apply more petroleum jelly and replace the bandage as needed. · Be safe with medicines. Take pain medicines exactly as directed. ¨ If the doctor gave you a prescription medicine for pain, take it as prescribed. ¨ If you are not taking a prescription pain medicine, ask your doctor if you can take an over-the-counter medicine. To prevent cellulitis in the future · Try to prevent cuts, scrapes, or other injuries to your skin. Cellulitis most often occurs where there is a break in the skin. · If you get a scrape, cut, mild burn, or bite, wash the wound with clean water as soon as you can to help avoid infection. Don't use hydrogen peroxide or alcohol, which can slow healing. · If you have swelling in your legs (edema), support stockings and good skin care may help prevent leg sores and cellulitis. · Take care of your feet, especially if you have diabetes or other conditions that increase the risk of infection. Wear shoes and socks. Do not go barefoot. If you have athlete's foot or other skin problems on your feet, talk to your doctor about how to treat them. When should you call for help? Call your doctor now or seek immediate medical care if: 
? · You have signs that your infection is getting worse, such as: 
¨ Increased pain, swelling, warmth, or redness. ¨ Red streaks leading from the area. ¨ Pus draining from the area. ¨ A fever. ? · You get a rash. ? Watch closely for changes in your health, and be sure to contact your doctor if: 
? · You are not getting better after 1 day (24 hours). ? · You do not get better as expected. Where can you learn more? Go to http://bowen-grupo.info/. Caio Ramses in the search box to learn more about \"Cellulitis: Care Instructions. \" Current as of: October 13, 2016 Content Version: 11.4 © 6073-8472 [employer]. Care instructions adapted under license by [employer] (which disclaims liability or warranty for this information). If you have questions about a medical condition or this instruction, always ask your healthcare professional. [address] any warranty or liability for your use of this information. High INR Test Result: Care Instructions Your Care Instructions You had a blood test to check how long it takes your blood to clot. This test is called a PT or prothrombin time test. The result of the test is called the INR level. A high INR level can happen when you take warfarin (Coumadin). Warfarin helps prevent blood clots. To do this, it slows the amount of time it takes for your blood to clot. This raises your INR level. The INR goal for people who take warfarin is usually from 2 to 3. A value higher than 3.5 increases the risk of bleeding problems. Many things can affect the way warfarin works. Some natural health products and other medicines can make warfarin work too well. That can raise the risk of bleeding. If you drink a lot of alcohol, that may raise your INR. And severe diarrhea or vomiting can also raise your INR. The best way to lower your INR will depend on several things. In some cases, the doctor may have you stop taking warfarin for a few days. You may also be given other medicines to take. You will need to be tested often to make sure your INR level is going down. You will also need to watch for signs of bleeding. The doctor has checked you carefully, but problems can develop later. If you notice any problems or new symptoms, get medical treatment right away. Follow-up care is a key part of your treatment and safety. Be sure to make and go to all appointments, and call your doctor if you are having problems. It's also a good idea to know your test results and keep a list of the medicines you take. How can you care for yourself at home? Be careful with medicines and foods · Don't start or stop taking any medicines, vitamins, or natural remedies unless you first talk to your doctor. · Keep the amount of vitamin K in your diet about the same from day to day. Do not suddenly eat a lot more or a lot less food that is rich in vitamin K than you usually do. Vitamin K affects how warfarin works and how your blood clots. · Limit your use of alcohol. Avoid bleeding by preventing falls · Wear slippers or shoes with nonskid soles. · Remove throw rugs and clutter. · Rearrange furniture and electrical cords to keep them out of walking paths. · Keep stairways, porches, and outside walkways well lit. Use night-lights in hallways and bathrooms. · Be extra careful when you work with sharp tools or knives. When should you call for help? Call 911 anytime you think you may need emergency care. For example, call if: 
? · You passed out (lost consciousness). ? · You have signs of severe bleeding, such as: ¨ A severe headache that is different from past headaches. ¨ Vomiting blood or what looks like coffee grounds. ¨ Passing maroon or very bloody stools. ?Call your doctor now or seek immediate medical care if: 
? · You have unexpected bleeding, including: ¨ Blood in stools or black stools that look like tar. ¨ Blood in your urine. ¨ Bruises or blood spots under the skin. ? · You feel dizzy or lightheaded. ? Watch closely for changes in your health, and be sure to contact your doctor if: 
? · You do not get better as expected. Where can you learn more? Go to http://bowen-grupo.info/. Enter C178 in the search box to learn more about \"High INR Test Result: Care Instructions. \" Current as of: September 21, 2016 Content Version: 11.4 © 4625-2195 [employer]. Care instructions adapted under license by [employer] (which disclaims liability or warranty for this information). If you have questions about a medical condition or this instruction, always ask your healthcare professional. [address] any warranty or liability for your use of this information. Skin Abscess: Care Instructions Your Care Instructions A skin abscess is a bacterial infection that forms a pocket of pus. A boil is a kind of skin abscess. The doctor may have cut an opening in the abscess so that the pus can drain out. You may have gauze in the cut so that the abscess will stay open and keep draining. You may need antibiotics. You will need to follow up with your doctor to make sure the infection has gone away. The doctor has checked you carefully, but problems can develop later. If you notice any problems or new symptoms, get medical treatment right away. Follow-up care is a key part of your treatment and safety. Be sure to make and go to all appointments, and call your doctor if you are having problems. It's also a good idea to know your test results and keep a list of the medicines you take. How can you care for yourself at home? · Apply warm and dry compresses, a heating pad set on low, or a hot water bottle 3 or 4 times a day for pain. Keep a cloth between the heat source and your skin. · If your doctor prescribed antibiotics, take them as directed. Do not stop taking them just because you feel better. You need to take the full course of antibiotics. · Take pain medicines exactly as directed. ¨ If the doctor gave you a prescription medicine for pain, take it as prescribed.  
¨ If you are not taking a prescription pain medicine, ask your doctor if you can take an over-the-counter medicine. · Keep your bandage clean and dry. Change the bandage whenever it gets wet or dirty, or at least one time a day. · If the abscess was packed with gauze: 
¨ Keep follow-up appointments to have the gauze changed or removed. If the doctor instructed you to remove the gauze, gently pull out all of the gauze when your doctor tells you to. ¨ After the gauze is removed, soak the area in warm water for 15 to 20 minutes 2 times a day, until the wound closes. When should you call for help? Call your doctor now or seek immediate medical care if: 
? · You have signs of worsening infection, such as: 
¨ Increased pain, swelling, warmth, or redness. ¨ Red streaks leading from the infected skin. ¨ Pus draining from the wound. ¨ A fever. ? Watch closely for changes in your health, and be sure to contact your doctor if: 
? · You do not get better as expected. Where can you learn more? Go to http://bowen-grupo.info/. Enter Z979 in the search box to learn more about \"Skin Abscess: Care Instructions. \" Current as of: October 13, 2016 Content Version: 11.4 © 2427-7818 [employer]. Care instructions adapted under license by [employer] (which disclaims liability or warranty for this information). If you have questions about a medical condition or this instruction, always ask your healthcare professional. [place] [address] any warranty or liability for your use of this information. High INR Test Result: Care Instructions Your Care Instructions You had a blood test to check how long it takes your blood to clot. This test is called a PT or prothrombin time test. The result of the test is called the INR level. A high INR level can happen when you take warfarin (Coumadin). Warfarin helps prevent blood clots.  To do this, it slows the amount of time it takes for your blood to clot. This raises your INR level. The INR goal for people who take warfarin is usually from 2 to 3.5. A value higher than 3.5 increases the risk of bleeding problems. Many things can affect the way warfarin works. Some natural health products and other medicines can make warfarin work too well. That can raise the risk of bleeding. If you drink a lot of alcohol, that may raise your INR. And severe diarrhea or vomiting can also raise your INR. The best way to lower your INR will depend on several things. In some cases, the doctor may have you stop taking warfarin for a few days. You may also be given other medicines to take. You will need to be tested often to make sure your INR level is going down. You will also need to watch for signs of bleeding. The doctor has checked you carefully, but problems can develop later. If you notice any problems or new symptoms, get medical treatment right away. Follow-up care is a key part of your treatment and safety. Be sure to make and go to all appointments, and call your doctor if you are having problems. It's also a good idea to know your test results and keep a list of the medicines you take. How can you care for yourself at home? Be careful with medicines and foods · Don't start or stop taking any medicines, vitamins, or natural remedies unless you first talk to your doctor. · Keep the amount of vitamin K in your diet about the same from day to day. Do not suddenly eat a lot more or a lot less food that is rich in vitamin K than you usually do. Vitamin K affects how warfarin works and how your blood clots. · Avoid cranberry juice and other cranberry products. They can increase the effects of warfarin. · Limit your use of alcohol. Avoid bleeding by preventing falls · Wear slippers or shoes with nonskid soles. · Remove throw rugs and clutter. · Rearrange furniture and electrical cords to keep them out of walking paths. · Keep stairways, porches, and outside walkways well lit. Use night-lights in hallways and bathrooms. · Be extra careful when you work with sharp tools or knives. When should you call for help? Call 911 anytime you think you may need emergency care. For example, call if: 
· You have a sudden, severe headache that is different from past headaches. Call your doctor now or seek immediate medical care if: 
· You have any abnormal bleeding, such as: 
¨ Nosebleeds. ¨ Vaginal bleeding that is different (heavier, more frequent, at a different time of the month) than what you are used to. ¨ Bloody or black stools, or rectal bleeding. ¨ Bloody or pink urine. Watch closely for changes in your health, and be sure to contact your doctor if you have any problems. Where can you learn more? Go to ClubLocal.be Enter C178 in the search box to learn more about \"High INR Test Result: Care Instructions. \"  
© 6460-0867 [employer]. Care instructions adapted under license by Leticia Richardson (which disclaims liability or warranty for this information). This care instruction is for use with your licensed healthcare professional. If you have questions about a medical condition or this instruction, always ask your healthcare professional. [address] any warranty or liability for your use of this information. Content Version: 08.3.501593; Current as of: November 20, 2015 Taking Warfarin Safely: Care Instructions Your Care Instructions Warfarin is a medicine that you take to prevent blood clots. It is often called a blood thinner. Doctors give warfarin (such as Coumadin) to reduce the risk of blood clots. You may be at risk for blood clots if you have atrial fibrillation or deep vein thrombosis. Some other health problems may also put you at risk. Warfarin slows the amount of time it takes for your blood to clot.  It can cause bleeding problems. Even if you've been taking warfarin for a while, it's important to know how to take it safely. Foods and other medicines can affect the way warfarin works. Some can make warfarin work too well. This can cause bleeding problems. And some can make it work poorly, so that it does not prevent blood clots very well. You will need regular blood tests to check how long it takes for your blood to form a clot. This test is called a PT or prothrombin time test. The result of the test is called an INR level. Depending on the test results, your doctor or anticoagulation clinic may adjust your dose of warfarin. Follow-up care is a key part of your treatment and safety. Be sure to make and go to all appointments, and call your doctor if you are having problems. It's also a good idea to know your test results and keep a list of the medicines you take. How can you care for yourself at home? Take warfarin safely · Take your warfarin at the same time each day. · If you miss a dose of warfarin, don't take an extra dose to make up for it. Your doctor can tell you exactly what to do so you don't take too much or too little. · Wear medical alert jewelry that lets others know that you take warfarin. You can buy this at most drugstores. · Don't take warfarin if you are pregnant or planning to get pregnant. Talk to your doctor about how you can prevent getting pregnant while you are taking it. · Don't change your dose or stop taking warfarin unless your doctor tells you to. Effects of medicines and food on warfarin · Don't start or stop taking any medicines, vitamins, or natural remedies unless you first talk to your doctor. Many medicines can affect how warfarin works. These include aspirin and other pain relievers, over-the-counter medicines, multivitamins, dietary supplements, and herbal products. · Tell all of your doctors and pharmacists that you take warfarin.  Some prescription medicines can affect how warfarin works. · Keep the amount of vitamin K in your diet about the same from day to day. Do not suddenly eat a lot more or a lot less food that is rich in vitamin K than you usually do. Vitamin K affects how warfarin works and how your blood clots. Talk with your doctor before making big changes in your diet. Foods that have a lot of vitamin K include cooked green vegetables, such as: ¨ Kale, spinach, turnip greens, rhonda greens, Swiss chard, and mustard greens. ¨ [place] sprouts, broccoli, and asparagus. · Limit your use of alcohol. Avoid bleeding by preventing falls and injuries · Wear slippers or shoes with nonskid soles. · Remove throw rugs and clutter. · Rearrange furniture and electrical cords to keep them out of walking paths. · Keep stairways, porches, and outside walkways well lit. Use night-lights in hallways and bathrooms. · Be extra careful when you work with sharp tools or knives. When should you call for help? Call 911 anytime you think you may need emergency care. For example, call if: 
? · You have a sudden, severe headache that is different from past headaches. ?Call your doctor now or seek immediate medical care if: 
? · You have any abnormal bleeding, such as: 
¨ Nosebleeds. ¨ Vaginal bleeding that is different (heavier, more frequent, at a different time of the month) than what you are used to. ¨ Bloody or black stools, or rectal bleeding. ¨ Bloody or pink urine. ? Watch closely for changes in your health, and be sure to contact your doctor if you have any problems. Where can you learn more? Go to http://bowen-grupo.info/. Enter S497 in the search box to learn more about \"Taking Warfarin Safely: Care Instructions. \" Current as of: September 21, 2016 Content Version: 11.4 © 9345-3505 [employer].  Care instructions adapted under license by [employer] (which disclaims liability or warranty for this information). If you have questions about a medical condition or this instruction, always ask your healthcare professional. [address] any warranty or liability for your use of this information. [employer] Announcement We are excited to announce that we are making your provider's discharge notes available to you in [employer]. You will see these notes when they are completed and signed by the physician that discharged you from your recent hospital stay. If you have any questions or concerns about any information you see in [employer], please call the Health Information Department where you were seen or reach out to your Primary Care Provider for more information about your plan of care. Unresulted Labs-Please follow up with your PCP about these lab tests Order Current Status CULTURE, BLOOD Preliminary result Providers Seen During Your Hospitalization Provider Specialty Primary office phone Best Mcdaniel MD Emergency Medicine [phone] Temo Appiah MD Emergency Medicine [phone] Aletta Dancer, MD [facility] [phone] Your Primary Care Physician (PCP) Primary Care Physician Office Phone Office Fax [address] [facility], [address] [place] [address] [phone] You are allergic to the following No active allergies Recent Documentation Height Weight Breastfeeding? BMI OB Status Smoking Status 1.575 m 142.9 kg No 57.61 kg/m2 Postmenopausal Never Smoker Emergency Contacts Name Discharge Info Relation Home Work Mobile Erich Weathers DISCHARGE CAREGIVER [3] Spouse [3] [phone] Patient Belongings The following personal items are in your possession at time of discharge: 
  Dental Appliances: Uppers  Visual Aid: Glasses      Home Medications: None   Jewelry: Ring (5 rings)  Clothing: At bedside Please provide this summary of care documentation to your next provider. Signatures-by signing, you are acknowledging that this After Visit Summary has been reviewed with you and you have received a copy. Patient Signature:  ____________________________________________________________ Date:  ____________________________________________________________  
  
Sruthi Audrey Provider Signature:  ____________________________________________________________ Date:  ____________________________________________________________

## 2017-10-27 NOTE — ED NOTES
Call rec'd from lab that wound culture that was sent earlier today by wound clinic needed to be redone with correct swab. Correct swab picked up from lab by this nurse and left knee wound was re-cultured and sent to lab. Patient to be admitted.

## 2017-10-27 NOTE — H&P
History & Physical    Patient: Sherly Kessler MRN: [id number]  CSN: [id number]    YOB: 1950  Age: 78 y.o. Sex: female      DOA: 10/27/2017  Primary Care Provider:  Rafael Causey DO      Assessment/Plan     Patient Active Problem List   Diagnosis Code    Morbid obesity ([facility] [address] E66.01    Venous reflux I87.2    Leg swelling M79.89    Cellulitis of left knee L03. 116    Nonhealing nonsurgical wound T14. 8XXA    Cellulitis of left lower extremity L03. 116     1. Cellulitis of Left Lower Extremity  2. Nonhealing, Nonsurgical Wound  3. Hypertension  4. Obesity  5. Hx of PE  6. Anticoagulation on Coumadin    1. Patient has significant edema, erythema, and induration surrounding her left knee. Patient initiated on Vancocin in the ER, and started on clindamycin as well. Patient denies pain at this time, will continue IV abx, obtain MRI of the knee, and ortho consulted in the ER. Patient has normal white count, no acute signs of sepsis, blood cultures pending. 2. As above. 3. Continue home medication - Toprol 25mg. BP stable in /50.  5. Patient states coumadin 6mg daily. Will have pharmacy dose coumadin upon admission. Patient has history of PE at [facility], and has since been started on coumadin. 6. As above. Blood cultures pending, wound cultures pending. DVT Prophy - Coumadin  IVF 50mL/hr  Ortho consult placed in ER, await consultation. Estimated length of stay : 3-4 nights     CC: Knee Wound       HPI:     Sherly Kessler is a 78 y.o. female who has a past medical history including HTN and Hx of PE previously who presents to the ER with complaints of a nonhealing left knee wound. She states that she fell on her left knee in August, and since then she has had difficulty with the wound. It has progressed from a scratch, to blisters, and now has developed into a wound with purulent discharge and foul odors.  She is seen by the wound clinic here at [facility], and has been a long time patient of theirs prior to this encounter. She was seen in the wound clinic today for routine follow up, and they instructed her to report to the ER for further evaluation. Patient's surgical history is only significant for abdominal surgery and an orthopedic R foot procedure. Past Medical History:   Diagnosis Date    Depression     Hypertension     Thromboembolus [facility])        Past Surgical History:   Procedure Laterality Date    ABDOMEN SURGERY PROC UNLISTED      HX CHOLECYSTECTOMY      HX ORTHOPAEDIC         Family History   Problem Relation Age of Onset    Diabetes Mother     Hypertension Mother     Cancer Father     Heart Disease Father        Social History     Social History    Marital status:      Spouse name: N/A    Number of children: N/A    Years of education: N/A     Social History Main Topics    Smoking status: Never Smoker    Smokeless tobacco: Never Used    Alcohol use Yes    Drug use: No    Sexual activity: Not Currently     Other Topics Concern    Not on file     Social History Narrative       Prior to Admission medications    Medication Sig Start Date End Date Taking? Authorizing Provider   warfarin (COUMADIN) 6 mg tablet Take 6 mg by mouth. 5/31/17   Historical Provider   escitalopram oxalate (LEXAPRO) 10 mg tablet TAKE 1 TABLET (10 MG TOTAL) BY MOUTH DAILY. 7/30/17   Historical Provider   ferrous sulfate 325 mg (65 mg iron) tablet Take 325 mg by mouth. 4/22/16   Historical Provider   metoprolol succinate (TOPROL XL) 25 mg XL tablet Take 1 Tab by mouth. 1/25/16   Historical Provider   cholecalciferol, vitamin D3, 2,000 unit tab Take 2,000 Units by mouth. Historical Provider   albuterol (VENTOLIN HFA) 90 mcg/actuation inhaler VENTOLIN  (90 Base) MCG/ACT AERS 1/25/16   Historical Provider   cyanocobalamin (VITAMIN B-12) 1,000 mcg sublingual tablet Take 1,000 mcg by mouth daily.     Historical Provider       No Known Allergies    Review of Systems  Gen: No fever, chills, malaise, weight loss/gain. Heent: No headache, rhinorrhea, epistaxis, ear pain, hearing loss, sinus pain, neck pain/stiffness, sore throat. Heart: No chest pain, palpitations, ARCHULETA, pnd, or orthopnea. Resp: No cough, hemoptysis, wheezing and shortness of breath. GI: No nausea, vomiting, diarrhea, constipation, melena or hematochezia. : No urinary obstruction, dysuria or hematuria. Derm: Nonhealing wound over left knee   Musc/skeletal: no bone or joint complains. Vasc: bilateral lower ext edema   Endo: No heat/cold intolerance, no polyuria,polydipsia or polyphagia. Neuro: No unilateral weakness, numbness, tingling. No seizures. Heme: No easy bruising or bleeding. Physical Exam:     Physical Exam:  Visit Vitals    /50 (BP 1 Location: Right arm, BP Patient Position: At rest)    Pulse 88    Temp 98.5 °F (36.9 °C)    Resp 15    Ht 5' 2\" (1.575 m)    Wt 136.1 kg (300 lb)    SpO2 100%    BMI 54.87 kg/m2      O2 Device: Room air    Temp (24hrs), Av.4 °F (36.9 °C), Min:98.2 °F (36.8 °C), Max:98.5 °F (36.9 °C)             General:  Awake, cooperative, no distress. Head:  Normocephalic, without obvious abnormality, atraumatic. Eyes:  Conjunctivae/corneas clear, sclera anicteric, PERRL, EOMs intact. Nose: Nares normal. No drainage or sinus tenderness. Throat: Lips, mucosa, and tongue normal.    Neck: Supple, symmetrical, trachea midline, no adenopathy. Lungs:   Clear to auscultation bilaterally. Heart:  Regular rate and rhythm, S1, S2 normal, no murmur, click, rub or gallop. Abdomen: Soft, non-tender. Bowel sounds normal. No masses,  No organomegaly. Extremities: Open wound over L patella. Large area of erythema, edema, and induration present. Wound with purulent discharge. Mildly malodorous. Venous stasis changes bilaterally. Trace edema present bilaterally. Pulses: 2+ and symmetric all extremities.    Skin: Skin color pink, turgor normal. No rashes or other than described above. Neurologic: CNII-XII intact. No focal motor or sensory deficit. Labs Reviewed: All lab results for the last 24 hours reviewed. Recent Results (from the past 24 hour(s))   METABOLIC PANEL, BASIC    Collection Time: 10/27/17 12:18 PM   Result Value Ref Range    Sodium 139 136 - 145 mmol/L    Potassium 4.4 3.5 - 5.5 mmol/L    Chloride 104 100 - 108 mmol/L    CO2 26 21 - 32 mmol/L    Anion gap 9 3.0 - 18 mmol/L    Glucose 105 (H) 74 - 99 mg/dL    BUN 11 7.0 - 18 MG/DL    Creatinine 0.82 0.6 - 1.3 MG/DL    BUN/Creatinine ratio 13 12 - 20      GFR est AA >60 >60 ml/min/1.73m2    GFR est non-AA >60 >60 ml/min/1.73m2    Calcium 8.9 8.5 - 10.1 MG/DL   PROTHROMBIN TIME + INR    Collection Time: 10/27/17 12:18 PM   Result Value Ref Range    Prothrombin time 25.2 (H) 11.5 - 15.2 sec    INR 2.4 (H) 0.8 - 1.2     CBC WITH AUTOMATED DIFF    Collection Time: 10/27/17  1:06 PM   Result Value Ref Range    WBC 6.1 4.6 - 13.2 K/uL    RBC 4.42 4.20 - 5.30 M/uL    HGB 11.5 (L) 12.0 - 16.0 g/dL    HCT 36.9 35.0 - 45.0 %    MCV 83.5 74.0 - 97.0 FL    MCH 26.0 24.0 - 34.0 PG    MCHC 31.2 31.0 - 37.0 g/dL    RDW 16.3 (H) 11.6 - 14.5 %    PLATELET 154 768 - 574 K/uL    MPV 9.8 9.2 - 11.8 FL    NEUTROPHILS 67 40 - 73 %    LYMPHOCYTES 23 21 - 52 %    MONOCYTES 6 3 - 10 %    EOSINOPHILS 3 0 - 5 %    BASOPHILS 1 0 - 2 %    ABS. NEUTROPHILS 4.0 1.8 - 8.0 K/UL    ABS. LYMPHOCYTES 1.4 0.9 - 3.6 K/UL    ABS. MONOCYTES 0.4 0.05 - 1.2 K/UL    ABS. EOSINOPHILS 0.2 0.0 - 0.4 K/UL    ABS.  BASOPHILS 0.0 0.0 - 0.06 K/UL    DF AUTOMATED         Procedures/imaging: see electronic medical records for all procedures/Xrays and details which were not copied into this note but were reviewed prior to creation of Plan    CC: Flor Duckworth DO

## 2017-10-28 ENCOUNTER — APPOINTMENT (OUTPATIENT)
Dept: MRI IMAGING | Age: 67
DRG: 603 | End: 2017-10-28
Attending: PHYSICIAN ASSISTANT
Payer: MEDICARE

## 2017-10-28 LAB
ANION GAP SERPL CALC-SCNC: 5 MMOL/L (ref 3–18)
BASOPHILS # BLD: 0 K/UL (ref 0–0.06)
BASOPHILS NFR BLD: 1 % (ref 0–2)
BUN SERPL-MCNC: 9 MG/DL (ref 7–18)
BUN/CREAT SERPL: 10 (ref 12–20)
CALCIUM SERPL-MCNC: 8.6 MG/DL (ref 8.5–10.1)
CHLORIDE SERPL-SCNC: 106 MMOL/L (ref 100–108)
CO2 SERPL-SCNC: 27 MMOL/L (ref 21–32)
CREAT SERPL-MCNC: 0.88 MG/DL (ref 0.6–1.3)
DIFFERENTIAL METHOD BLD: ABNORMAL
EOSINOPHIL # BLD: 0.2 K/UL (ref 0–0.4)
EOSINOPHIL NFR BLD: 4 % (ref 0–5)
ERYTHROCYTE [DISTWIDTH] IN BLOOD BY AUTOMATED COUNT: 16.3 % (ref 11.6–14.5)
GLUCOSE SERPL-MCNC: 102 MG/DL (ref 74–99)
HCT VFR BLD AUTO: 38.2 % (ref 35–45)
HGB BLD-MCNC: 11.8 G/DL (ref 12–16)
INR PPP: 2.6 (ref 0.8–1.2)
LYMPHOCYTES # BLD: 1.8 K/UL (ref 0.9–3.6)
LYMPHOCYTES NFR BLD: 31 % (ref 21–52)
MCH RBC QN AUTO: 25.7 PG (ref 24–34)
MCHC RBC AUTO-ENTMCNC: 30.9 G/DL (ref 31–37)
MCV RBC AUTO: 83 FL (ref 74–97)
MONOCYTES # BLD: 0.5 K/UL (ref 0.05–1.2)
MONOCYTES NFR BLD: 8 % (ref 3–10)
NEUTS SEG # BLD: 3.4 K/UL (ref 1.8–8)
NEUTS SEG NFR BLD: 56 % (ref 40–73)
PLATELET # BLD AUTO: 256 K/UL (ref 135–420)
PMV BLD AUTO: 9.4 FL (ref 9.2–11.8)
POTASSIUM SERPL-SCNC: 4.2 MMOL/L (ref 3.5–5.5)
PROTHROMBIN TIME: 27.2 SEC (ref 11.5–15.2)
RBC # BLD AUTO: 4.6 M/UL (ref 4.2–5.3)
SODIUM SERPL-SCNC: 138 MMOL/L (ref 136–145)
WBC # BLD AUTO: 6 K/UL (ref 4.6–13.2)

## 2017-10-28 PROCEDURE — 80048 BASIC METABOLIC PNL TOTAL CA: CPT | Performed by: PHYSICIAN ASSISTANT

## 2017-10-28 PROCEDURE — 74011250637 HC RX REV CODE- 250/637: Performed by: HOSPITALIST

## 2017-10-28 PROCEDURE — 73723 MRI JOINT LWR EXTR W/O&W/DYE: CPT

## 2017-10-28 PROCEDURE — 74011250637 HC RX REV CODE- 250/637: Performed by: PHYSICIAN ASSISTANT

## 2017-10-28 PROCEDURE — 99218 HC RM OBSERVATION: CPT

## 2017-10-28 PROCEDURE — A9585 GADOBUTROL INJECTION: HCPCS | Performed by: HOSPITALIST

## 2017-10-28 PROCEDURE — 74011000250 HC RX REV CODE- 250: Performed by: PHYSICIAN ASSISTANT

## 2017-10-28 PROCEDURE — 74011250636 HC RX REV CODE- 250/636: Performed by: PHYSICIAN ASSISTANT

## 2017-10-28 PROCEDURE — 65270000029 HC RM PRIVATE

## 2017-10-28 PROCEDURE — 74011250636 HC RX REV CODE- 250/636: Performed by: HOSPITALIST

## 2017-10-28 PROCEDURE — 85610 PROTHROMBIN TIME: CPT | Performed by: PHYSICIAN ASSISTANT

## 2017-10-28 PROCEDURE — 74011000258 HC RX REV CODE- 258: Performed by: PHYSICIAN ASSISTANT

## 2017-10-28 PROCEDURE — 85025 COMPLETE CBC W/AUTO DIFF WBC: CPT | Performed by: PHYSICIAN ASSISTANT

## 2017-10-28 PROCEDURE — 77030011256 HC DRSG MEPILEX <16IN NO BORD MOLN -A

## 2017-10-28 PROCEDURE — 36415 COLL VENOUS BLD VENIPUNCTURE: CPT | Performed by: PHYSICIAN ASSISTANT

## 2017-10-28 RX ORDER — WARFARIN 6 MG/1
6 TABLET ORAL ONCE
Status: COMPLETED | OUTPATIENT
Start: 2017-10-28 | End: 2017-10-28

## 2017-10-28 RX ADMIN — SODIUM CHLORIDE 600 MG: 900 INJECTION, SOLUTION INTRAVENOUS at 13:00

## 2017-10-28 RX ADMIN — FERROUS SULFATE TAB 325 MG (65 MG ELEMENTAL FE) 325 MG: 325 (65 FE) TAB at 13:00

## 2017-10-28 RX ADMIN — ALBUTEROL SULFATE 1 PUFF: 90 AEROSOL, METERED RESPIRATORY (INHALATION) at 02:43

## 2017-10-28 RX ADMIN — ESCITALOPRAM OXALATE 10 MG: 10 TABLET ORAL at 13:00

## 2017-10-28 RX ADMIN — SODIUM CHLORIDE 600 MG: 900 INJECTION, SOLUTION INTRAVENOUS at 20:22

## 2017-10-28 RX ADMIN — WARFARIN SODIUM 6 MG: 6 TABLET ORAL at 19:06

## 2017-10-28 RX ADMIN — Medication 10 ML: at 14:00

## 2017-10-28 RX ADMIN — GADOBUTROL 10 ML: 604.72 INJECTION INTRAVENOUS at 11:30

## 2017-10-28 RX ADMIN — VANCOMYCIN HYDROCHLORIDE 1750 MG: 10 INJECTION, POWDER, LYOPHILIZED, FOR SOLUTION INTRAVENOUS at 15:03

## 2017-10-28 RX ADMIN — CYANOCOBALAMIN TAB 500 MCG 1000 MCG: 500 TAB at 13:00

## 2017-10-28 RX ADMIN — ALBUTEROL SULFATE 1 PUFF: 90 AEROSOL, METERED RESPIRATORY (INHALATION) at 13:01

## 2017-10-28 RX ADMIN — SODIUM CHLORIDE 50 ML/HR: 900 INJECTION, SOLUTION INTRAVENOUS at 23:05

## 2017-10-28 RX ADMIN — ACETAMINOPHEN 650 MG: 325 TABLET ORAL at 13:13

## 2017-10-28 RX ADMIN — Medication 10 ML: at 23:04

## 2017-10-28 RX ADMIN — METOPROLOL SUCCINATE 25 MG: 25 TABLET, EXTENDED RELEASE ORAL at 13:00

## 2017-10-28 RX ADMIN — ALBUTEROL SULFATE 1 PUFF: 90 AEROSOL, METERED RESPIRATORY (INHALATION) at 06:33

## 2017-10-28 RX ADMIN — SODIUM CHLORIDE 600 MG: 900 INJECTION, SOLUTION INTRAVENOUS at 02:43

## 2017-10-28 RX ADMIN — VITAMIN D, TAB 1000IU (100/BT) 2000 UNITS: 25 TAB at 13:00

## 2017-10-28 RX ADMIN — ALBUTEROL SULFATE 1 PUFF: 90 AEROSOL, METERED RESPIRATORY (INHALATION) at 19:07

## 2017-10-28 NOTE — PROGRESS NOTES
Pharmacy Dosing Services:  Warfarin    Consult for Warfarin Dosing by Pharmacy by Dr. Libby Clemons provided for this 78 y.o.  female , for indication of h/o PE. Dose to achieve an INR goal of 2-3    Order entered for  Warfarin  6 (mg) ordered to be given today at 18:00. LABS    PT/INR Lab Results   Component Value Date/Time    INR 2.6 10/28/2017 01:50 AM      Platelets Lab Results   Component Value Date/Time    PLATELET 592 69/08/9143 01:50 AM      H/H     Lab Results   Component Value Date/Time    HGB 11.8 10/28/2017 01:50 AM        Warfarin Administrations (last 168 hours)     Date/Time Action Medication Dose    10/27/17 2045 Given    warfarin (COUMADIN) tablet 6 mg 6 mg          Pharmacy to follow daily and will provide subsequent Warfarin dosing based on clinical status.   Bosie Brunner, [facility]    Contact information    [phone]

## 2017-10-28 NOTE — PROGRESS NOTES
Bedside and Verbal shift change report given to ANNA Menjivar RN (oncoming nurse) by Fredrick Carter RN (offgoing nurse). Report included the following information SBAR and Kardex.      Patient Vitals for the past 12 hrs:   Temp Pulse Resp BP SpO2   10/27/17 2322 98 °F (36.7 °C) 77 20 134/55 96 %   10/27/17 1938 99 °F (37.2 °C) 93 15 139/67 96 %   10/27/17 1440 98.5 °F (36.9 °C) 88 15 121/50 100 %   10/27/17 1259 - - - - 100 %   10/27/17 1253 98.2 °F (36.8 °C) 81 19 113/43 100 %

## 2017-10-28 NOTE — PROGRESS NOTES
Shift Summary: patient ambulating well from the bed to the bathroom. On fall precaution. Informed patient to call for assistance when up. Dressing on the left knee was changed. Yellow drainage was noted on the wound on the left knee.

## 2017-10-28 NOTE — PROGRESS NOTES
Chart Reviewed. Noted patient admitted to the hospital for further medical management. Care Management to follow up with patient and/or family for transition of care needs prn. Oral and Written notification given to patient and/or caregiver informing them that they are currently an Outpatient receiving care in our facility. Outpatient services include Observation Services under [place] and [place] requirements.  notified and delivered letter.

## 2017-10-28 NOTE — PROGRESS NOTES
Hospitalist Progress Note    Patient: Aris Moore MRN: [id number]  CSN: [id number]    YOB: 1950  Age: 78 y.o. Sex: female    DOA: 10/27/2017 LOS:  LOS: 0 days          Chief Complaint: Knee abscess and cellulitis          Assessment/Plan     -Patient has improving edema, erythema, and induration surrounding her left knee. Patient initiated on Vancomycin and clindamycin in the ER which will be continued for now. Patient denies pain at this time, will continue IV abx. MRI of the knee is consistent with cellulitis, osteoarthritis, small effusion but showed no sign of osteo. Ortho saw patient an recommended plastic surgery and vascular consultations however the patient has previously been evaluated from a vascular standpoint by Dr Red Solano and reportedly their was no indication for intervention. Also, while the patient will likely eventually require plastic surgery it is doubtful that they would intervene at this point due to active infection for which she is receiving IV abx. Patient has normal white count, no acute signs of sepsis, blood cultures show no growth at 17 hrs.   -Continue home medication - Toprol 25mg. BP stable in /50. Pharmacy dosing coumadin upon admission; INR 2.6. Patient has history of PE at [facility], and has since been started on coumadin. -DVT Prophy - Coumadin  IVF 50mL/hr     Estimated length of stay : 2-3 nights     Disposition :  Patient Active Problem List   Diagnosis Code    Morbid obesity ([facility] [address] E66.01    Venous reflux I87.2    Leg swelling M79.89    Cellulitis of left knee L03. 116    Cellulitis of left lower extremity L03. 116    Anticoagulated on Coumadin Z51.81, Z79.01    Abscess of knee, left L02.416    History of pulmonary embolism Z86.711       Subjective:    No events over night. Patient doing well this AM.  No new complaints.       Review of systems:    Constitutional: denies fevers, chills, myalgias  Respiratory: denies SOB, cough  Cardiovascular: denies chest pain, palpitations  Gastrointestinal: denies nausea, vomiting, diarrhea      Vital signs/Intake and Output:  Visit Vitals    /78 (BP 1 Location: Left arm, BP Patient Position: Sitting)    Pulse 77    Temp 97.7 °F (36.5 °C)    Resp 20    Ht 5' 2\" (1.575 m)    Wt 141.7 kg (312 lb 4.8 oz)    SpO2 98%    Breastfeeding No    BMI 57.12 kg/m2     Current Shift:     Last three shifts:  10/26 1901 - 10/28 0700  In: 9721 [P.O.:600; I.V.:455]  Out: 700 [Urine:700]    Exam:    General: Well developed, alert, NAD, OX3  Head/Neck: NCAT, supple, No masses, No lymphadenopathy  CVS:Regular rate and rhythm, no M/R/G, S1/S2 heard, no thrill  Lungs:Clear to auscultation bilaterally, no wheezes, rhonchi, or rales  Abdomen: Soft, Nontender, No distention, Normal Bowel sounds, No hepatomegaly  Extremities: LLE dressing with purulent drainage from large anterior knee chronic appearing wound. Skin:normal texture and turgor, no rashes, no lesions  Neuro:grossly normal , follows commands  Psych:appropriate                Labs: Results:       Chemistry Recent Labs      10/28/17   0150  10/27/17   1218   GLU  102*  105*   NA  138  139   K  4.2  4.4   CL  106  104   CO2  27  26   BUN  9  11   CREA  0.88  0.82   CA  8.6  8.9   AGAP  5  9   BUCR  10*  13      CBC w/Diff Recent Labs      10/28/17   0150  10/27/17   1306   WBC  6.0  6.1   RBC  4.60  4.42   HGB  11.8*  11.5*   HCT  38.2  36.9   PLT  256  255   GRANS  56  67   LYMPH  31  23   EOS  4  3      Cardiac Enzymes No results for input(s): CPK, CKND1, JACKSON in the last 72 hours.     No lab exists for component: CKRMB, TROIP   Coagulation Recent Labs      10/28/17   0150  10/27/17   1218   PTP  27.2*  25.2*   INR  2.6*  2.4*       Lipid Panel No results found for: CHOL, CHOLPOCT, CHOLX, CHLST, CHOLV, 374378, HDL, LDL, LDLC, DLDLP, 445868, VLDLC, VLDL, TGLX, TRIGL, TRIGP, TGLPOCT, CHHD, CHHDX   BNP No results for input(s): BNPP in the last 72 hours.   Liver Enzymes No results for input(s): TP, ALB, TBIL, AP, SGOT, GPT in the last 72 hours.     No lab exists for component: DBIL   Thyroid Studies No results found for: T4, T3U, TSH, TSHEXT     Procedures/imaging: see electronic medical records for all procedures/Xrays and details which were not copied into this note but were reviewed prior to creation of Car Sal MD

## 2017-10-28 NOTE — PROGRESS NOTES
Pharmacy Dosing Services:  Warfarin    Consult for Warfarin Dosing by Pharmacy by Meg 9 provided for this 78 y.o.  female , for indication of h/o PE    Dose to achieve an INR goal of 2-3    Order entered for Warfarin 6mg ordered to be given today at 2100. PT/INR Lab Results   Component Value Date/Time    INR 2.4 10/27/2017 12:18 PM      Platelets Lab Results   Component Value Date/Time    PLATELET 271 19/09/3730 01:06 PM      H/H     Lab Results   Component Value Date/Time    HGB 11.5 10/27/2017 01:06 PM        Warfarin Administrations (last 168 hours)       None          Home dose = Warfarin 6mg po daily    Pharmacy to follow daily and will provide subsequent Warfarin dosing based on clinical status.   YAZAN Aguilera  Contact information  [phone]

## 2017-10-28 NOTE — ROUTINE PROCESS
Verbal shift change report given to Paulina Almonte RN (oncoming nurse) by Kelin Moser RN   (offgoing nurse). Report included the following information SBAR, Kardex, Intake/Output, MAR and Recent Results.

## 2017-10-28 NOTE — CONSULTS
JOINT  CONSULT    Subjective:     Date of Consultation:  October 28, 2017    Referring Physician:  Av Bedoya is a 78 y.o.  female who is being seen for left knee wound infection. Workup has revealed cellulitis and an open wound. She has been in the wound care clinic treated with po antibiotics and continues to have a lack of wound healing with serous drainage. Patient Active Problem List    Diagnosis Date Noted    Cellulitis of left knee 10/27/2017    Cellulitis of left lower extremity 10/27/2017    Anticoagulated on Coumadin 10/27/2017    Abscess of knee, left 10/27/2017    History of pulmonary embolism 10/27/2017    Morbid obesity ([facility] [address] 09/26/2017    Venous reflux 09/26/2017    Leg swelling 09/26/2017     Family History   Problem Relation Age of Onset    Diabetes Mother     Hypertension Mother     Cancer Father     Heart Disease Father       Social History   Substance Use Topics    Smoking status: Never Smoker    Smokeless tobacco: Never Used    Alcohol use Yes     Past Medical History:   Diagnosis Date    Depression     Hypertension     Thromboembolus ([facility] [address]       Past Surgical History:   Procedure Laterality Date    ABDOMEN SURGERY PROC UNLISTED      HX CHOLECYSTECTOMY      HX ORTHOPAEDIC        Prior to Admission medications    Medication Sig Start Date End Date Taking? Authorizing Provider   warfarin (COUMADIN) 6 mg tablet Take 6 mg by mouth. 5/31/17   Historical Provider   escitalopram oxalate (LEXAPRO) 10 mg tablet TAKE 1 TABLET (10 MG TOTAL) BY MOUTH DAILY. 7/30/17   Historical Provider   ferrous sulfate 325 mg (65 mg iron) tablet Take 325 mg by mouth. 4/22/16   Historical Provider   metoprolol succinate (TOPROL XL) 25 mg XL tablet Take 25 mg by mouth two (2) times a day. 1/25/16   Historical Provider   cholecalciferol, vitamin D3, 2,000 unit tab Take 2,000 Units by mouth.     Historical Provider   albuterol (VENTOLIN HFA) 90 mcg/actuation inhaler VENTOLIN  (90 Base) MCG/ACT AERS 16   Historical Provider   cyanocobalamin (VITAMIN B-12) 1,000 mcg sublingual tablet Take 1,000 mcg by mouth daily. Historical Provider     Current Facility-Administered Medications   Medication Dose Route Frequency    sodium chloride (NS) flush 5-10 mL  5-10 mL IntraVENous Q8H    sodium chloride (NS) flush 5-10 mL  5-10 mL IntraVENous PRN    clindamycin phosphate (CLEOCIN) 600 mg in 0.9% sodium chloride (MBP/ADV) 100 mL ADV  600 mg IntraVENous Q8H    acetaminophen (TYLENOL) tablet 650 mg  650 mg Oral Q4H PRN    HYDROcodone-acetaminophen (NORCO) 5-325 mg per tablet 1 Tab  1 Tab Oral Q4H PRN    cholecalciferol (VITAMIN D3) tablet 2,000 Units  2,000 Units Oral DAILY    cyanocobalamin (VITAMIN B12) tablet 1,000 mcg  1,000 mcg Oral DAILY    escitalopram oxalate (LEXAPRO) tablet 10 mg  10 mg Oral DAILY    ferrous sulfate tablet 325 mg  1 Tab Oral DAILY WITH BREAKFAST    metoprolol succinate (TOPROL-XL) XL tablet 25 mg  25 mg Oral DAILY    0.9% sodium chloride infusion  50 mL/hr IntraVENous CONTINUOUS    albuterol (PROVENTIL HFA, VENTOLIN HFA, PROAIR HFA) inhaler 1 Puff  1 Puff Inhalation Q4H RT    Vancomycin - Pharmacy to Dose  1 Each Other Rx Dosing/Monitoring    Warfarin - Pharmacy to Dose  1 Each Other Rx Dosing/Monitoring    vancomycin (VANCOCIN) 1,750 mg in 0.9% sodium chloride 500 mL IVPB  1,750 mg IntraVENous Q18H     No Known Allergies     Review of Systems:  A comprehensive review of systems was negative except for that written in the HPI.     Objective:     Patient Vitals for the past 8 hrs:   BP Temp Pulse Resp SpO2   10/28/17 0409 115/60 98.5 °F (36.9 °C) 73 20 94 %     Temp (24hrs), Av.4 °F (36.9 °C), Min:98 °F (36.7 °C), Max:99 °F (37.2 °C)        EXAM: General: alert, cooperative, no distress, appears stated age  Lungs: negative  Musculoskeletal: negative  no sign of infected knee joint, no signs of infected pre-patellar bursae  Skin:open wound with serous drainage,  subcutaneous tracks, no onur abscess    Data Review   Recent Results (from the past 24 hour(s))   CULTURE, WOUND W GRAM STAIN    Collection Time: 10/27/17 11:00 AM   Result Value Ref Range    Special Requests: NO SPECIAL REQUESTS      GRAM STAIN FEW WBC'S      GRAM STAIN MANY GRAM POSITIVE COCCI IN PAIRS      GRAM STAIN RARE GRAM POSITIVE COCCI IN GROUPS      Culture result: PENDING    METABOLIC PANEL, BASIC    Collection Time: 10/27/17 12:18 PM   Result Value Ref Range    Sodium 139 136 - 145 mmol/L    Potassium 4.4 3.5 - 5.5 mmol/L    Chloride 104 100 - 108 mmol/L    CO2 26 21 - 32 mmol/L    Anion gap 9 3.0 - 18 mmol/L    Glucose 105 (H) 74 - 99 mg/dL    BUN 11 7.0 - 18 MG/DL    Creatinine 0.82 0.6 - 1.3 MG/DL    BUN/Creatinine ratio 13 12 - 20      GFR est AA >60 >60 ml/min/1.73m2    GFR est non-AA >60 >60 ml/min/1.73m2    Calcium 8.9 8.5 - 10.1 MG/DL   CULTURE, BLOOD    Collection Time: 10/27/17 12:18 PM   Result Value Ref Range    Special Requests: NO SPECIAL REQUESTS      Culture result: NO GROWTH AFTER 17 HOURS     PROTHROMBIN TIME + INR    Collection Time: 10/27/17 12:18 PM   Result Value Ref Range    Prothrombin time 25.2 (H) 11.5 - 15.2 sec    INR 2.4 (H) 0.8 - 1.2     CBC WITH AUTOMATED DIFF    Collection Time: 10/27/17  1:06 PM   Result Value Ref Range    WBC 6.1 4.6 - 13.2 K/uL    RBC 4.42 4.20 - 5.30 M/uL    HGB 11.5 (L) 12.0 - 16.0 g/dL    HCT 36.9 35.0 - 45.0 %    MCV 83.5 74.0 - 97.0 FL    MCH 26.0 24.0 - 34.0 PG    MCHC 31.2 31.0 - 37.0 g/dL    RDW 16.3 (H) 11.6 - 14.5 %    PLATELET 390 066 - 988 K/uL    MPV 9.8 9.2 - 11.8 FL    NEUTROPHILS 67 40 - 73 %    LYMPHOCYTES 23 21 - 52 %    MONOCYTES 6 3 - 10 %    EOSINOPHILS 3 0 - 5 %    BASOPHILS 1 0 - 2 %    ABS. NEUTROPHILS 4.0 1.8 - 8.0 K/UL    ABS. LYMPHOCYTES 1.4 0.9 - 3.6 K/UL    ABS. MONOCYTES 0.4 0.05 - 1.2 K/UL    ABS. EOSINOPHILS 0.2 0.0 - 0.4 K/UL    ABS.  BASOPHILS 0.0 0.0 - 0.06 K/UL    DF AUTOMATED     CBC WITH AUTOMATED DIFF    Collection Time: 10/28/17  1:50 AM   Result Value Ref Range    WBC 6.0 4.6 - 13.2 K/uL    RBC 4.60 4.20 - 5.30 M/uL    HGB 11.8 (L) 12.0 - 16.0 g/dL    HCT 38.2 35.0 - 45.0 %    MCV 83.0 74.0 - 97.0 FL    MCH 25.7 24.0 - 34.0 PG    MCHC 30.9 (L) 31.0 - 37.0 g/dL    RDW 16.3 (H) 11.6 - 14.5 %    PLATELET 578 482 - 178 K/uL    MPV 9.4 9.2 - 11.8 FL    NEUTROPHILS 56 40 - 73 %    LYMPHOCYTES 31 21 - 52 %    MONOCYTES 8 3 - 10 %    EOSINOPHILS 4 0 - 5 %    BASOPHILS 1 0 - 2 %    ABS. NEUTROPHILS 3.4 1.8 - 8.0 K/UL    ABS. LYMPHOCYTES 1.8 0.9 - 3.6 K/UL    ABS. MONOCYTES 0.5 0.05 - 1.2 K/UL    ABS. EOSINOPHILS 0.2 0.0 - 0.4 K/UL    ABS.  BASOPHILS 0.0 0.0 - 0.06 K/UL    DF AUTOMATED     METABOLIC PANEL, BASIC    Collection Time: 10/28/17  1:50 AM   Result Value Ref Range    Sodium 138 136 - 145 mmol/L    Potassium 4.2 3.5 - 5.5 mmol/L    Chloride 106 100 - 108 mmol/L    CO2 27 21 - 32 mmol/L    Anion gap 5 3.0 - 18 mmol/L    Glucose 102 (H) 74 - 99 mg/dL    BUN 9 7.0 - 18 MG/DL    Creatinine 0.88 0.6 - 1.3 MG/DL    BUN/Creatinine ratio 10 (L) 12 - 20      GFR est AA >60 >60 ml/min/1.73m2    GFR est non-AA >60 >60 ml/min/1.73m2    Calcium 8.6 8.5 - 10.1 MG/DL   PROTHROMBIN TIME + INR    Collection Time: 10/28/17  1:50 AM   Result Value Ref Range    Prothrombin time 27.2 (H) 11.5 - 15.2 sec    INR 2.6 (H) 0.8 - 1.2           Assessment/Plan:     Non healing wound  Plan:  Would consult plastic surgery regarding possible coverage  Will review MRI that was ordered once completed  Continue anti-biotics and wound care  Would consider vascular consult  Does not appear have much orthopedicaly to offer at this point,  Patient needs plastic surgery for wound evaluation and treatment      Saadia Acosta MD

## 2017-10-28 NOTE — PROGRESS NOTES
0130 Ice pack provided for complaint of soreness on L upper arm. Mepiex to R knee D/I    Problem: Falls - Risk of  Goal: *Absence of Falls  Document Margo Fall Risk and appropriate interventions in the flowsheet. Outcome: Progressing Towards Goal  Fall Risk Interventions:            Medication Interventions: Teach patient to arise slowly, Patient to call before getting OOB         History of Falls Interventions: Investigate reason for fall      0330 No MRI screening nor consult notes seen in chart. Pt ambulated to bathroom without assist, voided to clear yellow urine. No complaints of pain.     0700 Unit secretary made aware that Ortho consult needs to be called. On IVF and IV antibiotics. Mepilex to L knee D/I. Passed on in report that MRI screening needs to be done.

## 2017-10-28 NOTE — PROGRESS NOTES
conducted an initial consultation and Spiritual Assessment for Rodolfo Calvert, who is a 78 y.o.,female. Patients Primary Language is: [place]. According to the patients EMR Muslim Affiliation is: Laurent Patel The reason the Patient came to the hospital is:   Patient Active Problem List    Diagnosis Date Noted    Cellulitis of left knee 10/27/2017    Cellulitis of left lower extremity 10/27/2017    Anticoagulated on Coumadin 10/27/2017    Abscess of knee, left 10/27/2017    History of pulmonary embolism 10/27/2017    Morbid obesity ([address] 09/26/2017    Venous reflux 09/26/2017    Leg swelling 09/26/2017      Patient sitting in recliner at bedside when  entered the room. Very friendly patient who appreciates pastoral care. Thankful for visit and asked for continued prayer. The  provided the following Interventions:  Initiated a relationship of care and support. Explored issues of alysa, belief, spirituality and Alevism/ritual needs while hospitalized. Listened empathically. Provided chaplaincy education. Provided information about Spiritual Care Services. Offered assurance of prayer on patient's behalf. Chart reviewed. The following outcomes where achieved:  Patient shared limited information about both their medical narrative and spiritual journey/beliefs.  confirmed Patient's Muslim Affiliation. Patient processed feeling about current hospitalization. Patient expressed gratitude for 's visit. Assessment:  Patient does not have any Alevism/cultural needs that will affect patients preferences in health care. Plan:  Chaplains will continue to follow and will provide pastoral care on an as needed/requested basis.  recommends bedside caregivers page  on duty if patient shows signs of acute spiritual or emotional distress.     The [address] [place] [address]  [phone]

## 2017-10-29 LAB
ANION GAP SERPL CALC-SCNC: 7 MMOL/L (ref 3–18)
BASOPHILS # BLD: 0 K/UL (ref 0–0.06)
BASOPHILS NFR BLD: 1 % (ref 0–2)
BUN SERPL-MCNC: 8 MG/DL (ref 7–18)
BUN/CREAT SERPL: 11 (ref 12–20)
CALCIUM SERPL-MCNC: 8.4 MG/DL (ref 8.5–10.1)
CHLORIDE SERPL-SCNC: 109 MMOL/L (ref 100–108)
CO2 SERPL-SCNC: 26 MMOL/L (ref 21–32)
CREAT SERPL-MCNC: 0.75 MG/DL (ref 0.6–1.3)
DIFFERENTIAL METHOD BLD: ABNORMAL
EOSINOPHIL # BLD: 0.2 K/UL (ref 0–0.4)
EOSINOPHIL NFR BLD: 4 % (ref 0–5)
ERYTHROCYTE [DISTWIDTH] IN BLOOD BY AUTOMATED COUNT: 16.1 % (ref 11.6–14.5)
GLUCOSE SERPL-MCNC: 100 MG/DL (ref 74–99)
HCT VFR BLD AUTO: 34.7 % (ref 35–45)
HGB BLD-MCNC: 10.7 G/DL (ref 12–16)
INR PPP: 3.6 (ref 0.8–1.2)
LYMPHOCYTES # BLD: 1.6 K/UL (ref 0.9–3.6)
LYMPHOCYTES NFR BLD: 33 % (ref 21–52)
MCH RBC QN AUTO: 25.8 PG (ref 24–34)
MCHC RBC AUTO-ENTMCNC: 30.8 G/DL (ref 31–37)
MCV RBC AUTO: 83.8 FL (ref 74–97)
MONOCYTES # BLD: 0.5 K/UL (ref 0.05–1.2)
MONOCYTES NFR BLD: 10 % (ref 3–10)
NEUTS SEG # BLD: 2.5 K/UL (ref 1.8–8)
NEUTS SEG NFR BLD: 52 % (ref 40–73)
PLATELET # BLD AUTO: 231 K/UL (ref 135–420)
PMV BLD AUTO: 9.4 FL (ref 9.2–11.8)
POTASSIUM SERPL-SCNC: 4.2 MMOL/L (ref 3.5–5.5)
PROTHROMBIN TIME: 35.3 SEC (ref 11.5–15.2)
RBC # BLD AUTO: 4.14 M/UL (ref 4.2–5.3)
SODIUM SERPL-SCNC: 142 MMOL/L (ref 136–145)
WBC # BLD AUTO: 4.8 K/UL (ref 4.6–13.2)

## 2017-10-29 PROCEDURE — 65270000029 HC RM PRIVATE

## 2017-10-29 PROCEDURE — 36415 COLL VENOUS BLD VENIPUNCTURE: CPT | Performed by: PHYSICIAN ASSISTANT

## 2017-10-29 PROCEDURE — 74011250636 HC RX REV CODE- 250/636: Performed by: PHYSICIAN ASSISTANT

## 2017-10-29 PROCEDURE — 74011000258 HC RX REV CODE- 258: Performed by: PHYSICIAN ASSISTANT

## 2017-10-29 PROCEDURE — 85610 PROTHROMBIN TIME: CPT | Performed by: PHYSICIAN ASSISTANT

## 2017-10-29 PROCEDURE — 74011250637 HC RX REV CODE- 250/637: Performed by: PHYSICIAN ASSISTANT

## 2017-10-29 PROCEDURE — 80048 BASIC METABOLIC PNL TOTAL CA: CPT | Performed by: PHYSICIAN ASSISTANT

## 2017-10-29 PROCEDURE — 74011000250 HC RX REV CODE- 250: Performed by: PHYSICIAN ASSISTANT

## 2017-10-29 PROCEDURE — 85025 COMPLETE CBC W/AUTO DIFF WBC: CPT | Performed by: PHYSICIAN ASSISTANT

## 2017-10-29 RX ADMIN — SODIUM CHLORIDE 600 MG: 900 INJECTION, SOLUTION INTRAVENOUS at 04:20

## 2017-10-29 RX ADMIN — SODIUM CHLORIDE 600 MG: 900 INJECTION, SOLUTION INTRAVENOUS at 20:02

## 2017-10-29 RX ADMIN — SODIUM CHLORIDE 600 MG: 900 INJECTION, SOLUTION INTRAVENOUS at 12:37

## 2017-10-29 RX ADMIN — CYANOCOBALAMIN TAB 500 MCG 1000 MCG: 500 TAB at 09:18

## 2017-10-29 RX ADMIN — VANCOMYCIN HYDROCHLORIDE 1750 MG: 10 INJECTION, POWDER, LYOPHILIZED, FOR SOLUTION INTRAVENOUS at 09:17

## 2017-10-29 RX ADMIN — Medication 10 ML: at 06:52

## 2017-10-29 RX ADMIN — Medication 10 ML: at 15:55

## 2017-10-29 RX ADMIN — VITAMIN D, TAB 1000IU (100/BT) 2000 UNITS: 25 TAB at 09:18

## 2017-10-29 RX ADMIN — ALBUTEROL SULFATE 1 PUFF: 90 AEROSOL, METERED RESPIRATORY (INHALATION) at 22:00

## 2017-10-29 RX ADMIN — FERROUS SULFATE TAB 325 MG (65 MG ELEMENTAL FE) 325 MG: 325 (65 FE) TAB at 09:18

## 2017-10-29 RX ADMIN — ESCITALOPRAM OXALATE 10 MG: 10 TABLET ORAL at 09:18

## 2017-10-29 NOTE — PROGRESS NOTES
Hospitalist Progress Note    Patient: Elie Montero MRN: [id number]  CSN: [id number]    YOB: 1950  Age: 75 y.o. Sex: female    DOA: 10/27/2017 LOS:  LOS: 1 day          Chief Complaint: Knee abscess and cellulitis          Assessment/Plan     -Patient has improving edema, erythema, and induration surrounding her left knee. Patient initiated on Vancomycin and clindamycin in the ER which will be continued for now. Patient denies pain at this time, will continue IV abx. MRI of the knee is consistent with cellulitis, osteoarthritis, small effusion but showed no sign of osteo. -Ortho saw patient an recommended plastic surgery and vascular consultations however the patient has previously been evaluated from a vascular standpoint by Dr Michell Kern and reportedly their was no indication for intervention.    -Patient would likely benefit from plastic surgery would evaluation but there is currently no Plastic surgeon on call. Would engage medical director on Monday to explore alternative avenues. Patient has normal white count, no acute signs of sepsis, blood cultures show no growth at 2 days.   -Continue home medication - Toprol 25mg. BP stable in /50. Pharmacy dosing coumadin upon admission; INR 3.6; pharmacy to make adjustment. Patient has history of PE at [facility] [place] [place], and has since been started on coumadin. -DVT Prophy - Coumadin  IVF 50mL/hr     Estimated length of stay : 1-2 nights     Disposition : likely home with home health    Patient Active Problem List   Diagnosis Code    Morbid obesity ([facility] [address] E66.01    Venous reflux I87.2    Leg swelling M79.89    Cellulitis of left knee L03. 116    Cellulitis of left lower extremity L03. 116    Anticoagulated on Coumadin Z51.81, Z79.01    Abscess of knee, left L02.416    History of pulmonary embolism Z86.711       Subjective:    No events over night. Patient doing well this AM.  No new complaints.       Review of systems:    Constitutional: denies fevers, chills, myalgias  Respiratory: denies SOB, cough  Cardiovascular: denies chest pain, palpitations  Gastrointestinal: denies nausea, vomiting, diarrhea      Vital signs/Intake and Output:  Visit Vitals    /73 (BP 1 Location: Right arm, BP Patient Position: At rest)    Pulse 62    Temp 97.9 °F (36.6 °C)    Resp 18    Ht 5' 2\" (1.575 m)    Wt 141.5 kg (312 lb)    SpO2 95%    Breastfeeding No    BMI 57.07 kg/m2     Current Shift:     Last three shifts:  10/27 1901 - 10/29 0700  In: 5861 [P.O.:895; I.V.:2620]  Out: 2200 [Urine:2200]    Exam:    General: Well developed, alert, NAD, OX3  Head/Neck: NCAT, supple, No masses, No lymphadenopathy  CVS:Regular rate and rhythm, no M/R/G, S1/S2 heard, no thrill  Lungs:Clear to auscultation bilaterally, no wheezes, rhonchi, or rales  Abdomen: Soft, Nontender, No distention, Normal Bowel sounds, No hepatomegaly  Extremities: LLE dressing with purulent drainage from large anterior knee chronic appearing wound. Skin:normal texture and turgor, no rashes, no lesions  Neuro:grossly normal , follows commands  Psych:appropriate                Labs: Results:       Chemistry Recent Labs      10/29/17   0505  10/28/17   0150  10/27/17   1218   GLU  100*  102*  105*   NA  142  138  139   K  4.2  4.2  4.4   CL  109*  106  104   CO2  26  27  26   BUN  8  9  11   CREA  0.75  0.88  0.82   CA  8.4*  8.6  8.9   AGAP  7  5  9   BUCR  11*  10*  13      CBC w/Diff Recent Labs      10/29/17   0505  10/28/17   0150  10/27/17   1306   WBC  4.8  6.0  6.1   RBC  4.14*  4.60  4.42   HGB  10.7*  11.8*  11.5*   HCT  34.7*  38.2  36.9   PLT  231  256  255   GRANS  52  56  67   LYMPH  33  31  23   EOS  4  4  3      Cardiac Enzymes No results for input(s): CPK, CKND1, JACKSON in the last 72 hours.     No lab exists for component: CKRMB, TROIP   Coagulation Recent Labs      10/29/17   0505  10/28/17   0150   PTP  35.3*  27.2*   INR  3.6*  2.6*       Lipid Panel No results found for: CHOL, CHOLPOCT, CHOLX, CHLST, CHOLV, 906941, HDL, LDL, LDLC, DLDLP, 771233, VLDLC, VLDL, TGLX, TRIGL, TRIGP, TGLPOCT, CHHD, CHHDX   BNP No results for input(s): BNPP in the last 72 hours. Liver Enzymes No results for input(s): TP, ALB, TBIL, AP, SGOT, GPT in the last 72 hours.     No lab exists for component: DBIL   Thyroid Studies No results found for: T4, T3U, TSH, TSHEXT, TSHEXT     Procedures/imaging: see electronic medical records for all procedures/Xrays and details which were not copied into this note but were reviewed prior to creation of Cal Oneal MD

## 2017-10-29 NOTE — PROGRESS NOTES
Shift summary:     Pt. Completed MRI, PA updated. Tylenol given for arm pain, effective. Pt. Up ad valerio. No complaints of leg pain. Eating 100% of meals. Rested in bed, call light within reach, family visited.      Patient Vitals for the past 12 hrs:   Temp Pulse Resp BP SpO2   10/28/17 2000 98 °F (36.7 °C) 60 18 136/65 97 %   10/28/17 1510 97.9 °F (36.6 °C) 62 20 134/75 94 %   10/28/17 1208 98 °F (36.7 °C) 75 20 142/72 99 %

## 2017-10-29 NOTE — CONSULTS
MRI reveiewed. Fluid collection draining through open wound. No orthopaedic intervention needed at this time.   Would recommend wound evaluation by plastic surgery to determine extent of soft tissue debridement/reconstruction may be needed    Will sign off this case at this time  Please re-consult if situation changes

## 2017-10-29 NOTE — ROUTINE PROCESS
Bedside and Verbal shift change report given to ANNA Aldrich (oncoming nurse) by Reuben Paget, RN  (offgoing nurse). Report included the following information SBAR, Kardex, Intake/Output and MAR.

## 2017-10-29 NOTE — ROUTINE PROCESS
Verbal shift change report given to Padmini Quesada (oncoming nurse) by Jamie Hall   (offgoing nurse). Report included the following information SBAR, Kardex and MAR.

## 2017-10-29 NOTE — ROUTINE PROCESS
Bedside shift change report given to Merlin Grandchild RN (oncoming nurse) by Foreign De Los Santos RN (offgoing nurse). Report included the following information SBAR, Kardex, Procedure Summary, Intake/Output, MAR, Recent Results and Med Rec Status. 0895 Unable to determine plastic surgery consult as there is no designated Plastic Surgeon on call. Spoke with Nursing supervisor who states there is no plastic surgeon on call at this time. Discussed with Dr. Marilin Dean, who states to re-evaluate consult tomorrow to determine appropriate on call physician. Shift summary: pt alert, denies pain, cooperative with care. Dressing to left knee changed today, area open, unapproximated, with wet yellow tissue to base of wound and draining serous/yellow tinged drainage.

## 2017-10-29 NOTE — PROGRESS NOTES
Pharmacy Dosing Services:  Warfarin    Consult for Warfarin Dosing by Pharmacy by Dr. Bo Meyer provided for this 75 y.o.  female , for indication of h/o PE. Dose to achieve an INR goal of 2-3    Warfarin to be Held Tonight ( INR 3.6 ). LABS    PT/INR Lab Results   Component Value Date/Time    INR 3.6 10/29/2017 05:05 AM      Platelets Lab Results   Component Value Date/Time    PLATELET 261 30/94/1377 05:05 AM      H/H     Lab Results   Component Value Date/Time    HGB 10.7 10/29/2017 05:05 AM        Warfarin Administrations (last 168 hours)     Date/Time Action Medication Dose    10/28/17 1906 Given    warfarin (COUMADIN) tablet 6 mg 6 mg    10/27/17 2045 Given    warfarin (COUMADIN) tablet 6 mg 6 mg          Pharmacy to follow daily and will provide subsequent Warfarin dosing based on clinical status.   [facility]  Contact information   [phone]

## 2017-10-29 NOTE — PROGRESS NOTES
Problem: Falls - Risk of  Goal: *Absence of Falls  Document Margo Fall Risk and appropriate interventions in the flowsheet.    Outcome: Progressing Towards Goal  Fall Risk Interventions:            Medication Interventions: Patient to call before getting OOB    Elimination Interventions: Call light in reach, Patient to call for help with toileting needs, Toileting schedule/hourly rounds    History of Falls Interventions: Door open when patient unattended, Investigate reason for fall

## 2017-10-30 LAB
ANION GAP SERPL CALC-SCNC: 7 MMOL/L (ref 3–18)
BACTERIA SPEC CULT: ABNORMAL
BUN SERPL-MCNC: 10 MG/DL (ref 7–18)
BUN/CREAT SERPL: 13 (ref 12–20)
CALCIUM SERPL-MCNC: 8.4 MG/DL (ref 8.5–10.1)
CHLORIDE SERPL-SCNC: 107 MMOL/L (ref 100–108)
CO2 SERPL-SCNC: 26 MMOL/L (ref 21–32)
CREAT SERPL-MCNC: 0.79 MG/DL (ref 0.6–1.3)
GLUCOSE SERPL-MCNC: 91 MG/DL (ref 74–99)
GRAM STN SPEC: ABNORMAL
INR PPP: 3.2 (ref 0.8–1.2)
POTASSIUM SERPL-SCNC: 4 MMOL/L (ref 3.5–5.5)
PROTHROMBIN TIME: 32.1 SEC (ref 11.5–15.2)
SERVICE CMNT-IMP: ABNORMAL
SODIUM SERPL-SCNC: 140 MMOL/L (ref 136–145)

## 2017-10-30 PROCEDURE — 85610 PROTHROMBIN TIME: CPT | Performed by: PHYSICIAN ASSISTANT

## 2017-10-30 PROCEDURE — 80048 BASIC METABOLIC PNL TOTAL CA: CPT | Performed by: PHYSICIAN ASSISTANT

## 2017-10-30 PROCEDURE — 65270000029 HC RM PRIVATE

## 2017-10-30 PROCEDURE — 74011000250 HC RX REV CODE- 250: Performed by: PHYSICIAN ASSISTANT

## 2017-10-30 PROCEDURE — 74011250636 HC RX REV CODE- 250/636: Performed by: PHYSICIAN ASSISTANT

## 2017-10-30 PROCEDURE — 36415 COLL VENOUS BLD VENIPUNCTURE: CPT | Performed by: PHYSICIAN ASSISTANT

## 2017-10-30 PROCEDURE — 74011000258 HC RX REV CODE- 258: Performed by: PHYSICIAN ASSISTANT

## 2017-10-30 PROCEDURE — 97161 PT EVAL LOW COMPLEX 20 MIN: CPT

## 2017-10-30 PROCEDURE — 74011250637 HC RX REV CODE- 250/637: Performed by: PHYSICIAN ASSISTANT

## 2017-10-30 RX ORDER — WARFARIN 1 MG/1
1 TABLET ORAL ONCE
Status: COMPLETED | OUTPATIENT
Start: 2017-10-30 | End: 2017-10-30

## 2017-10-30 RX ADMIN — ACETAMINOPHEN 650 MG: 325 TABLET ORAL at 00:28

## 2017-10-30 RX ADMIN — ACETAMINOPHEN 650 MG: 325 TABLET ORAL at 21:31

## 2017-10-30 RX ADMIN — Medication 10 ML: at 21:28

## 2017-10-30 RX ADMIN — VITAMIN D, TAB 1000IU (100/BT) 2000 UNITS: 25 TAB at 09:43

## 2017-10-30 RX ADMIN — METOPROLOL SUCCINATE 25 MG: 25 TABLET, EXTENDED RELEASE ORAL at 09:42

## 2017-10-30 RX ADMIN — VANCOMYCIN HYDROCHLORIDE 1750 MG: 10 INJECTION, POWDER, LYOPHILIZED, FOR SOLUTION INTRAVENOUS at 21:27

## 2017-10-30 RX ADMIN — ESCITALOPRAM OXALATE 10 MG: 10 TABLET ORAL at 09:42

## 2017-10-30 RX ADMIN — ALBUTEROL SULFATE 1 PUFF: 90 AEROSOL, METERED RESPIRATORY (INHALATION) at 03:22

## 2017-10-30 RX ADMIN — WARFARIN SODIUM 1 MG: 1 TABLET ORAL at 18:19

## 2017-10-30 RX ADMIN — Medication 10 ML: at 13:56

## 2017-10-30 RX ADMIN — SODIUM CHLORIDE 50 ML/HR: 900 INJECTION, SOLUTION INTRAVENOUS at 11:16

## 2017-10-30 RX ADMIN — CEFTRIAXONE 1 G: 1 INJECTION, POWDER, FOR SOLUTION INTRAMUSCULAR; INTRAVENOUS at 16:04

## 2017-10-30 RX ADMIN — CYANOCOBALAMIN TAB 500 MCG 1000 MCG: 500 TAB at 09:42

## 2017-10-30 RX ADMIN — SODIUM CHLORIDE 600 MG: 900 INJECTION, SOLUTION INTRAVENOUS at 13:55

## 2017-10-30 RX ADMIN — VANCOMYCIN HYDROCHLORIDE 1750 MG: 10 INJECTION, POWDER, LYOPHILIZED, FOR SOLUTION INTRAVENOUS at 03:22

## 2017-10-30 RX ADMIN — SODIUM CHLORIDE 600 MG: 900 INJECTION, SOLUTION INTRAVENOUS at 05:57

## 2017-10-30 RX ADMIN — FERROUS SULFATE TAB 325 MG (65 MG ELEMENTAL FE) 325 MG: 325 (65 FE) TAB at 09:42

## 2017-10-30 NOTE — PROGRESS NOTES
Shift Summary:  Uneventful shift. Patient required Tylenol 650 mg po for arthritic pain to shoulders, neck, and back, which provided relief. Dressing to left knee CDI. BLE red and warm with edema. LLE swelling greater than RLE.     Patient Vitals for the past 12 hrs:   Temp Pulse Resp BP SpO2   10/30/17 0354 97.8 °F (36.6 °C) 60 18 141/64 96 %   10/30/17 0322 - 74 - - -   10/29/17 2339 98.2 °F (36.8 °C) 76 18 129/71 96 %   10/29/17 1927 98.2 °F (36.8 °C) 66 18 147/77 98 %

## 2017-10-30 NOTE — ROUTINE PROCESS
Bedside and Verbal shift change report given to Elie Rosado RN (oncoming nurse) by Ethan Winston RN  (offgoing nurse). Report included the following information SBAR, Kardex, Intake/Output and MAR.

## 2017-10-30 NOTE — PROGRESS NOTES
SHIFT SUMMARY: No events. She ate dinner. Mepilex on left knee has some drainage. Recommended to oncoming nurse to change later in the night. Pt denies pain, but also states that legs have decreased sensation. Able to ambulate. Pt's son visited during this short shift.

## 2017-10-30 NOTE — PROGRESS NOTES
Problem: Mobility Impaired (Adult and Pediatric)  Goal: *Acute Goals and Plan of Care (Insert Text)  Physical Therapy Goals LT/ST  Initiated 10/30/2017 and to be accomplished within 3-5 day(s)  1. Patient will move from supine <> sit with S in prep for out of bed activity and change of position. 2.  Patient will perform sit<> stand with S with LRAD in prep for transfers/ambulation. 3.  Patient will transfer from bed <> chair with S with LRAD for time up in chair for completion of ADL activity. 4.  Patient will ambulate 150 feet with LRAD/S for improved functional mobility/safe discharge. .   5.  Patient will ascend/descend > stairs with handrail with contact guard assist for home re-entry as needed. Outcome: Progressing Towards Goal  physical Therapy EVALUATION    Patient: Rony Taylor (30 y.o. female)  Date: 10/30/2017  Primary Diagnosis: Cellulitis of left knee  Cellulitis of left lower extremity  Nonhealing nonsurgical wound  Cellulitis of left lower extremity        Precautions:  Fall    ASSESSMENT :  Based on the objective data described below, the patient presents with decrease independence in bed mobility, transfers, gait and step negotiation. Pt seen sitting at the EOB prior to session w/ IV and pt's friend present in the room. Pt reports no pain at this time but reports numbness of B/L LE. B/L LE demonstrate redness and bluish color that is not tender to palpate. Pt reports PTA that she was independent w/ mobility task and rarely used an AD to ambulate, only for long distances she needed an AD. Pt reporting falling on B/L knees in August secondary to missing a step upon entering a room, however she reported she did not have any dizziness or lightheadness during occurrence. Pt reported that she did see a doctor after occurrence but no problems were found of the knee. Pt was able to ambulate 50 ft w/ GB, however PT assessed pt w/ RW and pt demonstrated better stability w/ RW at this time.  Pt demonstrated a lateral weight shift, wide BRIGID, decrease tunde, decrease stride length, and decrease step clearance. Pt required VCing to maintain head erect while ambulating. Once pt was transferred back to room, pt reported decrease activity tolerance after gait activity. Pt was left sitting at the EOB, call bell and tray in reach, nurse notified after session. Recommend skilled Acute PT services at this time to increase pt's endurance, strength, and activity tolerance upon d/c from hospital.     Patient will benefit from skilled intervention to address the above impairments. Patients rehabilitation potential is considered to be Good  Factors which may influence rehabilitation potential include:   []         None noted  []         Mental ability/status  [x]         Medical condition  [x]         Home/family situation and support systems  [x]         Safety awareness  []         Pain tolerance/management  []         Other:      PLAN :  Recommendations and Planned Interventions:  [x]           Bed Mobility Training             []    Neuromuscular Re-Education  [x]           Transfer Training                   []    Orthotic/Prosthetic Training  [x]           Gait Training                          []    Modalities  [x]           Therapeutic Exercises          []    Edema Management/Control  [x]           Therapeutic Activities            [x]    Patient and Family Training/Education  []           Other (comment):    Frequency/Duration: Patient will be followed by physical therapy once/twice daily to address goals. Discharge Recommendations: Home Health  Further Equipment Recommendations for Discharge: N/A     SUBJECTIVE:   Patient stated I can't feel my legs.     OBJECTIVE DATA SUMMARY:     Past Medical History:   Diagnosis Date    Depression     Hypertension     Thromboembolus [facility])      Past Surgical History:   Procedure Laterality Date    ABDOMEN SURGERY PROC UNLISTED      HX CHOLECYSTECTOMY      HX ORTHOPAEDIC       Barriers to Learning/Limitations: yes;  physical  Compensate with: Verbal Cues and Tactile Cues  Prior Level of Function/Home Situation:   Home Situation  Home Environment: Private residence  # Steps to Enter: 3  Rails to Enter: Yes  Hand Rails : Bilateral  One/Two Story Residence: Two story  # of Interior Steps: [address]  Interior Rails: Left  Lift Chair Available: Yes  Living Alone: No  Support Systems: Spouse/Significant Other/Partner  Patient Expects to be Discharged to[de-identified] Private residence  Current DME Used/Available at Home: wale [place], Walker, rolling  Critical Behavior:  Neurologic State: Alert  Orientation Level: Oriented X4  Cognition: Follows commands  Safety/Judgement: Awareness of environment; Fall prevention  Psychosocial  Purposeful Interaction: Yes  Pt Identified Daily Priority: Clinical issues (comment)  Caritas Process: Establish trust;Teaching/learning  Caring Interventions: Reassure  Reassure: Therapeutic listening  Skin Condition/Temp: Warm  Skin Integrity: Wound (add Wound LDA) (left knee)  Skin Integumentary  Skin Color: Appropriate for ethnicity; Mottled (comment) (bilateral legs)  Skin Condition/Temp: Warm  Skin Integrity: Wound (add Wound LDA) (left knee)  Turgor: Epidermis thin w/ loss of subcut tissue  Hair Growth: Present  Strength:    Strength: Generally decreased, functional  Tone & Sensation:   Tone: Normal  Sensation: Impaired (B/L LE)  Range Of Motion:  AROM: Generally decreased, functional  PROM: Generally decreased, functional  Functional Mobility:  Transfers:  Sit to Stand: Supervision;Contact guard assistance  Stand to Sit: Supervision;Contact guard assistance  Balance:   Sitting: Intact  Standing: Intact; With support; Without support  Ambulation/Gait Training:  Distance (ft): 120 Feet (ft)  Assistive Device: Gait belt;Walker, rolling  Ambulation - Level of Assistance: Contact guard assistance  Gait Description (WDL): Exceptions to WDL  Gait Abnormalities: Decreased step clearance  Base of Support: Widened  Stance: Weight shift  Speed/Bea: Slow  Step Length: Left shortened;Right shortened  Swing Pattern: Left asymmetrical;Right asymmetrical  Pain:  Pain Scale 1: Numeric (0 - 10)  Pain Intensity 1: 0  Activity Tolerance:   Fair+  Please refer to the flowsheet for vital signs taken during this treatment. After treatment:   []         Patient left in no apparent distress sitting up in chair  [x]         Patient left in no apparent distress in bed  [x]         Call bell left within reach  [x]         Nursing notified  []         Caregiver present  []         Bed alarm activated    COMMUNICATION/EDUCATION:   [x]         Fall prevention education was provided and the patient/caregiver indicated understanding. [x]         Patient/family have participated as able in goal setting and plan of care. [x]         Patient/family agree to work toward stated goals and plan of care. []         Patient understands intent and goals of therapy, but is neutral about his/her participation. []         Patient is unable to participate in goal setting and plan of care. Thank you for this referral.  Kirill Vincent, PT   Time Calculation: 17 mins     Mobility:  Current  CI= 1-19%   Goal  CI= 1-19%. The severity rating is based on the Other Based on functional assessment

## 2017-10-30 NOTE — PROGRESS NOTES
Pt admitted due to a non healing left LLE wound. Pt does have a cane that she uses as needed. Pt is  and has been driving herself to and from the wound clinic. Notified pt will need IVABX upon discharge. Plan PICC placement. CM awaiting IVABX determination to move forward with identifying an appropriate plan of care. Please provide the ABX, duration and frequency to assist with plan of care. Continuing to follow and assist.  Anticipate [facility] vs HH vs SNF upon discharge. Please order PT to further  assist with plan of care needs. Discharge Reassessment Plan:  Low Risk    RRAT Score:  1 - 12     Low Risk Care Transition Interventions:  1. Discharge transition plan:  [facility] vs HH vs SNF  2. Involved patient/caregiver in assessment, planning, education and implement of intervention. 3. CM daily patient care huddles/interdisciplinary rounds were completed. 4. PCP/Specialist appointment within 5 days made prior to discharge. Date/Time  5. Facilitated transportation and logistics for follow-up appointments. 6. Handoff to [facility] Nurse Navigator or PCP practice. Care Management Interventions  PCP Verified by CM: Yes  Mode of Transport at Discharge:  Other (see comment) (Spouse)  Transition of Care Consult (CM Consult): Discharge Planning  Health Maintenance Reviewed: Yes  Physical Therapy Consult: Yes  Occupational Therapy Consult: Yes  Current Support Network: Lives with Spouse  Confirm Follow Up Transport: Family  Plan discussed with Pt/Family/Caregiver: Yes  Discharge Location  Discharge Placement: Home with outpatient services ([facility] vs HH vs SNF)

## 2017-10-30 NOTE — ROUTINE PROCESS
Bedside and Verbal shift change report given to HENRY Masterson RN (oncoming nurse) by Oni Guan (offgoing nurse). Report included the following information SBAR, Kardex, Intake/Output and MAR.

## 2017-10-30 NOTE — WOUND CARE
Patient seen by wound care for wound consult to left knee. Area cleaned and dressed with mesalt, 4x4, and a mepilex border. Wound care available if neded further.

## 2017-10-30 NOTE — ROUTINE PROCESS
Bedside shift change report given to KANWAL Castaneda RN (oncoming nurse) by Mehran Lucia RN (offgoing nurse). Report included the following information SBAR, Kardex, Procedure Summary, Intake/Output, MAR, Recent Results and Med Rec Status.

## 2017-10-30 NOTE — PROGRESS NOTES
Hospitalist Progress Note    Patient: Aris Moore MRN: [id number]  CSN: [id number]    YOB: 1950  Age: 75 y.o. Sex: female    DOA: 10/27/2017 LOS:  LOS: 2 days          Chief Complaint:    Nonhealing Wound     Assessment/Plan     1. Cellulitis of LLE  2. Abscess of Left Knee  3. Hypertension  4. Obesity  5. Hx of PE  6. Anticoagulation on Coumadin    1. Edema, erythema, and induration surrounding left knee improving. 2. Wound cultures were positive, adjusted antibiotics accordingly - continue IV Vancocin and switch clindamycin to IV Rocephin. Patient continues to have no signs of sepsis, has remained nontoxic, and her white count is stable. Patient has been afebrile. MRI shows no signs of osteomyelitis. Blood cultures negative at this time. Ortho was consulted but signed off, recommending plastics consult. However, we do not have plastics available at [facility]. Discussed this with Dr Abrahan Gaona, who suggested if the patient is stable enough to either d/c with oral abx or requires PICC for IV abx, to do so and get patient the follow up with plastics that she needs as an outpatient. 3. BP remaining stable, continue home regimen. 6. Patient has had an elevated INR, today INR is >3.0; Audrey with pharmacy states that they have been holding a few doses because of an elevated INR. Will continue to monitor. Estimated length of stay: 24-48 hours      Patient Active Problem List   Diagnosis Code    [facility] E66.01    Venous reflux I87.2    Leg swelling M79.89    Cellulitis of left knee L03. 116    Cellulitis of left lower extremity L03. 116    Anticoagulated on Coumadin Z51.81, Z79.01    Abscess of knee, left L02.416    History of pulmonary embolism Z86.711       Subjective:    Patient is concerned over plans for this upcoming weekend    Review of systems:    Constitutional: denies fevers, chills, myalgias  Respiratory: denies SOB, cough  Cardiovascular: denies chest pain, palpitations  Gastrointestinal: denies nausea, vomiting, diarrhea      Vital signs/Intake and Output:  Visit Vitals    /78    Pulse 63    Temp 98 °F (36.7 °C)    Resp 18    Ht 5' 2\" (1.575 m)    Wt 142.9 kg (315 lb)    SpO2 94%    Breastfeeding No    BMI 57.61 kg/m2     Current Shift:  10/30 0701 - 10/30 1900  In: 1476 [P.O.:480; I.V.:680]  Out: 700 [Urine:700]  Last three shifts:  10/28 1901 - 10/30 0700  In: 0700 [P.O.:1005; I.V.:3162]  Out: 1750 [Urine:1750]    Exam:    General: Obese, alert, NAD, OX3  Head/Neck: NCAT, supple, No masses, No lymphadenopathy  CVS:Regular rate and rhythm, no M/R/G, S1/S2 heard, no thrill  Lungs:Clear to auscultation bilaterally, no wheezes, rhonchi, or rales  Abdomen: Soft, Nontender, No distention, Normal Bowel sounds, No hepatomegaly  Extremities: Edema present bilaterally, pulses palpable 2+  Skin:Skin overlying patient's left knee with open wound, purulent drainage, malodorous. Area is erythematous, edematous, and indurated. Patient has no sensation involving wound. Neuro:grossly normal , follows commands  Psych:appropriate                Labs: Results:       Chemistry Recent Labs      10/30/17   0435  10/29/17   0505  10/28/17   0150   GLU  91  100*  102*   NA  140  142  138   K  4.0  4.2  4.2   CL  107  109*  106   CO2  26  26  27   BUN  10  8  9   CREA  0.79  0.75  0.88   CA  8.4*  8.4*  8.6   AGAP  7  7  5   BUCR  13  11*  10*      CBC w/Diff Recent Labs      10/29/17   0505  10/28/17   0150   WBC  4.8  6.0   RBC  4.14*  4.60   HGB  10.7*  11.8*   HCT  34.7*  38.2   PLT  231  256   GRANS  52  56   LYMPH  33  31   EOS  4  4      Cardiac Enzymes No results for input(s): CPK, CKND1, JACKSON in the last 72 hours.     No lab exists for component: CKRMB, TROIP   Coagulation Recent Labs      10/30/17   0435  10/29/17   0505   PTP  32.1*  35.3*   INR  3.2*  3.6*       Lipid Panel No results found for: CHOL, CHOLPOCT, CHOLX, CHLST, CHOLV, 125772, HDL, LDL, LDLC, DLDLP, 678515, VLDLC, VLDL, TGLX, TRIGL, TRIGP, TGLPOCT, CHHD, CHHDX   BNP No results for input(s): BNPP in the last 72 hours. Liver Enzymes No results for input(s): TP, ALB, TBIL, AP, SGOT, GPT in the last 72 hours.     No lab exists for component: DBIL   Thyroid Studies No results found for: T4, T3U, TSH, TSHEXT     Procedures/imaging: see electronic medical records for all procedures/Xrays and details which were not copied into this note but were reviewed prior to creation of [address] Dania Goodrich, PA-C

## 2017-10-30 NOTE — PROGRESS NOTES
0740-received report from [facility] [employer] included SBAR MAR and Kardex. Bedside and Verbal shift change report given to ROCK Gudino RN (oncoming nurse) by Lizzy Michelle RN (offgoing nurse). Report included the following information SBAR, Kardex and MAR.

## 2017-10-30 NOTE — PROGRESS NOTES
Pharmacy Dosing Services: Warfarin    Consult for Warfarin Dosing by Pharmacy by Meg 9 provided for this 75 y.o.  female , for indication of H/O PE  Day of Therapy :  continuation from home regimen  Dose to achieve an INR goal of 2-3    Order entered for  Warfarin  1 mg to be given today at 18:00. INR 3.6 ==> 3.2, Warfarin HELD x1 day, 29Oct2017. Significant decrease;  will dose low tonight. LABS    PT/INR Lab Results   Component Value Date/Time    INR 3.2 10/30/2017 04:35 AM      Platelets Lab Results   Component Value Date/Time    PLATELET 365 26/88/9281 05:05 AM      H/H     Lab Results   Component Value Date/Time    HGB 10.7 10/29/2017 05:05 AM        Warfarin Administrations (last 168 hours)       Date/Time Action Medication Dose      10/28/17 1906 Given    warfarin (COUMADIN) tablet 6 mg 6 mg    10/27/17 2045 Given    warfarin (COUMADIN) tablet 6 mg 6 mg          Pharmacy to follow daily and will provide subsequent Warfarin dosing based on clinical status. Lelo Santiago, Pharm. D.   Clinical Pharmacist  [phone]

## 2017-10-30 NOTE — CDMP QUERY
Please clarify if this patient is still under treatment for Acute PE or on chronic Coumadin. \"Acute\" defines the period of time beginning with the initial diagnosis, up to and including the entire period of time where anticoagulation is administered (3-12 months). =>Other Explanation of clinical findings  =>Unable to Determine (no explanation of clinical findings)    The medical record reflects the following:    Risk:  morbid obesity    Clinical Indicators:  H&p:  5. Hx of PE  6. Anticoagulation on Coumadin  Patient has [facility] of PE at [facility], and has since been started on coumadin. Please clarify and document your clinical opinion in the progress notes and discharge summary including the definitive and/or presumptive diagnosis, (suspected or probable), related to the above clinical findings. Please include clinical findings supporting your diagnosis. If you DECLINE this query or would like to communicate with [facility], please utilize the \"[employer] message box\" at the TOP of the Progress Note on the right. Thank you.   Lawanda Sorensen RN BSN CCDS [phone]

## 2017-10-31 VITALS
RESPIRATION RATE: 18 BRPM | BODY MASS INDEX: 53.92 KG/M2 | DIASTOLIC BLOOD PRESSURE: 74 MMHG | WEIGHT: 293 LBS | SYSTOLIC BLOOD PRESSURE: 149 MMHG | HEIGHT: 62 IN | TEMPERATURE: 97.7 F | HEART RATE: 65 BPM | OXYGEN SATURATION: 96 %

## 2017-10-31 LAB
ANION GAP SERPL CALC-SCNC: 10 MMOL/L (ref 3–18)
BUN SERPL-MCNC: 6 MG/DL (ref 7–18)
BUN/CREAT SERPL: 7 (ref 12–20)
CALCIUM SERPL-MCNC: 8.8 MG/DL (ref 8.5–10.1)
CHLORIDE SERPL-SCNC: 107 MMOL/L (ref 100–108)
CO2 SERPL-SCNC: 26 MMOL/L (ref 21–32)
CREAT SERPL-MCNC: 0.85 MG/DL (ref 0.6–1.3)
GLUCOSE SERPL-MCNC: 109 MG/DL (ref 74–99)
INR PPP: 2.6 (ref 0.8–1.2)
POTASSIUM SERPL-SCNC: 4.2 MMOL/L (ref 3.5–5.5)
PROTHROMBIN TIME: 27.1 SEC (ref 11.5–15.2)
SODIUM SERPL-SCNC: 143 MMOL/L (ref 136–145)

## 2017-10-31 PROCEDURE — 85610 PROTHROMBIN TIME: CPT | Performed by: HOSPITALIST

## 2017-10-31 PROCEDURE — 36415 COLL VENOUS BLD VENIPUNCTURE: CPT | Performed by: HOSPITALIST

## 2017-10-31 PROCEDURE — 77030033263 HC DRSG MEPILEX 16-48IN BORD MOLN -B

## 2017-10-31 PROCEDURE — 74011250637 HC RX REV CODE- 250/637: Performed by: PHYSICIAN ASSISTANT

## 2017-10-31 PROCEDURE — 80048 BASIC METABOLIC PNL TOTAL CA: CPT | Performed by: PHYSICIAN ASSISTANT

## 2017-10-31 PROCEDURE — 77030011256 HC DRSG MEPILEX <16IN NO BORD MOLN -A

## 2017-10-31 RX ORDER — WARFARIN 3 MG/1
3 TABLET ORAL DAILY
Qty: 30 TAB | Refills: 0 | Status: SHIPPED | OUTPATIENT
Start: 2017-10-31

## 2017-10-31 RX ORDER — AMOXICILLIN AND CLAVULANATE POTASSIUM 875; 125 MG/1; MG/1
1 TABLET, FILM COATED ORAL 2 TIMES DAILY
Qty: 28 TAB | Refills: 0 | Status: SHIPPED | OUTPATIENT
Start: 2017-10-31 | End: 2018-06-13

## 2017-10-31 RX ADMIN — CYANOCOBALAMIN TAB 500 MCG 1000 MCG: 500 TAB at 08:57

## 2017-10-31 RX ADMIN — FERROUS SULFATE TAB 325 MG (65 MG ELEMENTAL FE) 325 MG: 325 (65 FE) TAB at 08:57

## 2017-10-31 RX ADMIN — VITAMIN D, TAB 1000IU (100/BT) 2000 UNITS: 25 TAB at 08:56

## 2017-10-31 RX ADMIN — METOPROLOL SUCCINATE 25 MG: 25 TABLET, EXTENDED RELEASE ORAL at 08:57

## 2017-10-31 RX ADMIN — ESCITALOPRAM OXALATE 10 MG: 10 TABLET ORAL at 08:57

## 2017-10-31 NOTE — PROGRESS NOTES
No new changes on this shift. Bedside shift change report given to Alisson Aleman RN (oncoming nurse) by Kathy Argueta RN (offgoing nurse). Report included the following information SBAR, Kardex, Procedure Summary, MAR and Recent Results.

## 2017-10-31 NOTE — DISCHARGE SUMMARY
Discharge Summary    Patient: Ziggy London MRN: [id number]  CSN: [id number]    YOB: 1950  Age: 75 y.o. Sex: female    DOA: 10/27/2017 LOS:  LOS: 3 days   Discharge Date:      Primary Care Provider:  Cecilia Haque DO    Admission Diagnoses: Cellulitis of left knee  Cellulitis of left lower extremity  Nonhealing nonsurgical wound  Cellulitis of left lower extremity    Discharge Diagnoses:    Problem List as of 10/31/2017  Date Reviewed: 10/25/2017          Codes Class Noted - Resolved    * (Principal)Cellulitis of left knee ICD-10-CM: L03.116  ICD-9-CM: 682.6  10/27/2017 - Present        Cellulitis of left lower extremity ICD-10-CM: L03.116  ICD-9-CM: 682.6  10/27/2017 - Present        Anticoagulated on Coumadin ICD-10-CM: Z51.81, Z79.01  ICD-9-CM: V58.83, V58.61  10/27/2017 - Present        Abscess of knee, left ICD-10-CM: L02.416  ICD-9-CM: 682.6  10/27/2017 - Present        History of pulmonary embolism ICD-10-CM: J77.313  ICD-9-CM: V12.55  10/27/2017 - Present        Morbid obesity ([address] ICD-10-CM: E66.01  ICD-9-CM: 278.01  9/26/2017 - Present        Venous reflux ICD-10-CM: I87.2  ICD-9-CM: 459.81  9/26/2017 - Present        Leg swelling ICD-10-CM: M79.89  ICD-9-CM: 729.81  9/26/2017 - Present        RESOLVED: Nonhealing nonsurgical wound ICD-10-CM: T14. 8XXA  ICD-9-CM: 879.9  10/27/2017 - 10/27/2017              Discharge Medications:     Current Discharge Medication List      START taking these medications    Details   amoxicillin-clavulanate (AUGMENTIN) 875-125 mg per tablet Take 1 Tab by mouth two (2) times a day. Qty: 28 Tab, Refills: 0         CONTINUE these medications which have CHANGED    Details   warfarin (COUMADIN) 3 mg tablet Take 1 Tab by mouth daily. Qty: 30 Tab, Refills: 0         CONTINUE these medications which have NOT CHANGED    Details   escitalopram oxalate (LEXAPRO) 10 mg tablet TAKE 1 TABLET (10 MG TOTAL) BY MOUTH DAILY.       ferrous sulfate 325 mg (65 mg iron) tablet Take 325 mg by mouth.      metoprolol succinate (TOPROL XL) 25 mg XL tablet Take 25 mg by mouth two (2) times a day. cholecalciferol, vitamin D3, 2,000 unit tab Take 2,000 Units by mouth. albuterol (VENTOLIN HFA) 90 mcg/actuation inhaler VENTOLIN  (90 Base) MCG/ACT AERS      cyanocobalamin (VITAMIN B-12) 1,000 mcg sublingual tablet Take 1,000 mcg by mouth daily. Discharge Condition: Good    Procedures : None    Consults: Orthopedics      PHYSICAL EXAM   Visit Vitals    /74 (BP 1 Location: Left arm, BP Patient Position: At rest)    Pulse 65    Temp 97.7 °F (36.5 °C)    Resp 18    Ht 5' 2\" (1.575 m)    Wt 142.9 kg (315 lb)    SpO2 96%    Breastfeeding No    BMI 57.61 kg/m2     General: Awake, cooperative, no acute distress    HEENT: NC, Atraumatic. PERRLA, EOMI. Anicteric sclerae. Lungs:  CTA Bilaterally. No Wheezing/Rhonchi/Rales. Heart:  Regular  rhythm,  No murmur, No Rubs, No Gallops  Abdomen: Soft, Non distended, Non tender. +Bowel sounds,   Extremities: Abscess over L knee, area of improving erythema, edema, and induration. Abscess packed, dressed, and dressing clean, dry, and intact. Venous stasis changes present bilaterally. Psych:   Not anxious or agitated. Neurologic:  No acute neurological deficits. Admission HPI : Awais Riley is a 78 y.o. female who has a past medical history including HTN and Hx of PE previously who presents to the ER with complaints of a nonhealing left knee wound. She states that she fell on her left knee in August, and since then she has had difficulty with the wound. It has progressed from a scratch, to blisters, and now has developed into a wound with purulent discharge and foul odors. She is seen by the wound clinic here at [facility], and has been a long time patient of theirs prior to this encounter.  She was seen in the wound clinic today for routine follow up, and they instructed her to report to the ER for further evaluation. Patient's surgical history is only significant for abdominal surgery and an orthopedic R foot procedure. Hospital Course : This patient was admitted to medical services on October 27, 2017 after being seen in the wound clinic for an abscess that had developed over her left knee. In the ER, orthopedics was consulted. Orthopedics stated that at this time, as evident on MRI, there was no need for orthopedic intervention, and they recommended plastics consultation. We do not have plastics services available at this hospital. Upon admission, the patient was started on empiric abx, she received a total of 4 days IV abx. The patient remained stable, afebrile, normotensive, and nontoxic throughout her stay. Her wound culture returned positive, and the correct antibiotics were selected for outpatient management. She will be discharged on 14 days of Augmentin, with outpatient follow up with Dr Patti Bunch, whom she sees at the wound clinic, for further recommendations and arrangement with plastics. Activity: Activity as tolerated    Diet: Regular Diet    Follow-up: This patient was instructed to follow up with Dr Patti Bunch at the wound care clinic for further management and recommendations, and to arrange follow up with plastics. Disposition: This patient will be discharged home today. She has had an uncomplicated hospital course, received 4 days of IV abx, and will continue oral abx as an outpatient with adequate wound care follow up.     Minutes spent on discharge: 35 min       Labs: Results:       Chemistry Recent Labs      10/31/17   1030  10/30/17   0435  10/29/17   0505   GLU  109*  91  100*   NA  143  140  142   K  4.2  4.0  4.2   CL  107  107  109*   CO2  26  26  26   BUN  6*  10  8   CREA  0.85  0.79  0.75   CA  8.8  8.4*  8.4*   AGAP  10  7  7   BUCR  7*  13  11*      CBC w/Diff Recent Labs      10/29/17   0505   WBC  4.8   RBC  4.14*   HGB  10.7*   HCT  34.7*   PLT  231 GRANS  52   LYMPH  33   EOS  4      Cardiac Enzymes No results for input(s): CPK, CKND1, JACKSON in the last 72 hours. No lab exists for component: CKRMB, TROIP   Coagulation Recent Labs      10/31/17   0256  10/30/17   0435   PTP  27.1*  32.1*   INR  2.6*  3.2*       Lipid Panel No results found for: CHOL, CHOLPOCT, CHOLX, CHLST, CHOLV, 799338, HDL, LDL, LDLC, DLDLP, 596645, VLDLC, VLDL, TGLX, TRIGL, TRIGP, TGLPOCT, CHHD, CHHDX   BNP No results for input(s): BNPP in the last 72 hours. Liver Enzymes No results for input(s): TP, ALB, TBIL, AP, SGOT, GPT in the last 72 hours. No lab exists for component: DBIL   Thyroid Studies No results found for: T4, T3U, TSH, TSHEXT         Significant Diagnostic Studies: Xr Knee Lt Min 4 V    Result Date: 10/27/2017  Indication: Infection. Pain and immobility. Comments: AP, crosstable lateral, and bilateral oblique views of the left knee are submitted for evaluation. Skin irregularity and soft tissue swelling noted anterior to the patellar tendon, probable soft tissue injury or reported infection. No underlying foreign bodies. There is no evidence of fracture or dislocation. No joint effusion is identified. There are prominent tricompartment osteophytes. Osseous mineralization is normal.     IMPRESSION: 1. Anterior soft tissue irregularity and swelling suggestive of soft tissue injury or infection anterior to the patellar tendon. No underlying foreign bodies. 2. No acute osseous abnormality identified. Moderate tricompartment osteoarthritis. Mri Knee Esequiel Galeas Wo Cont    Result Date: 10/28/2017  Examination: MRI left knee without contrast. HISTORY: 25-year-old patient with open wound to the anterior left knee. Evaluation for possible drainable collection or osteomyelitis is requested. TECHNIQUE: MR examination of the left knee was performed utilizing a local coil.  Coronal and sagittal T1 and T2 fat-suppressed images; as well as axial T1, T2, T1 fat-suppressed images were obtained. 10 mL Gadavist intravenous gadolinium contrast was administered uneventfully and postcontrast imaging was performed utilizing T1 fat-suppressed techniques and axial and sagittal planes. COMPARISON: Left knee radiographs dated 10/27/2017. FINDINGS: Evaluation of the bone marrow signal of the left knee demonstrates normal marrow signal, without evidence of osteomyelitis. There are expected areas of red marrow deposition in the distal femur and proximal tibia. Similarly, there is no acute marrow signal alteration. There is no evidence of fracture or marrow contusion. Although not performed as an evaluation for possible internal derangement, complex tearing of the medial meniscus body and posterior horn is noted along with inner margin radial tearing of the lateral meniscus. There is tricompartmental right knee chondrosis and osteoarthrosis, which is most advanced in the medial and patellofemoral compartments. Anterior and posterior cruciate ligaments appear intact. The extensor mechanism is intact. The quadriceps and patellar tendons are normal in morphology, with minimal insertional patellar tendinosis demonstrated. There is a small synovitic joint effusion. In keeping with the provided history, there is an open soft tissue wound involving the right leg, both above and below the knee joint. Sinus tract to the skin is noted on postcontrast imaging (image 18) with overlying dressing. Approximate size of this collection is estimated approximately 9.7 cm craniocaudal, approximately 14 cm medial to lateral, with maximal thickness noted above the lateral joint line of approximately 1.5 cm. Soft tissue gas as evidenced by dephasing artifact is noted within this collection and in the adjacent soft tissues. Neurovascular bundles are intact. Diffuse circumferential soft tissue edema and skin thickening noted. IMPRESSION: 1.  Open soft tissue wound involving the anterior left knee as above, with foci of soft tissue gas and rim enhancement. Adjacent skin thickening and enhancement most in keeping with contemporaneous cellulitis. 2. Intact extensor mechanism, with mild insertional patellar tendinosis. 3. Normal bone marrow signal about the left knee, with without evidence to suggest osteomyelitis. 4. Small, synovitic joint effusion. 5. Tricompartmental left knee chondrosis and osteoarthrosis, most advanced in the medial tibiofemoral and patellofemoral compartments. No results found for this or any previous visit.         CC: Andrea Higgins, DO

## 2017-10-31 NOTE — PROGRESS NOTES
Notified pt will not need IVABX upon discharge and will be discharged on po ABX. Pt has been ambulating independently in the halls with a RW, which is her base line. Pt is to be discharged today with follow up with her PCP who will assist with a referral to a physician with a specialty in plastics and wound care clinic follow up. No other plan of care needs have been identified.

## 2017-10-31 NOTE — DISCHARGE INSTRUCTIONS
Skin Abscess: Care Instructions  Your Care Instructions    A skin abscess is a bacterial infection that forms a pocket of pus. A boil is a kind of skin abscess. The doctor may have cut an opening in the abscess so that the pus can drain out. You may have gauze in the cut so that the abscess will stay open and keep draining. You may need antibiotics. You will need to follow up with your doctor to make sure the infection has gone away. The doctor has checked you carefully, but problems can develop later. If you notice any problems or new symptoms, get medical treatment right away. Follow-up care is a key part of your treatment and safety. Be sure to make and go to all appointments, and call your doctor if you are having problems. It's also a good idea to know your test results and keep a list of the medicines you take. How can you care for yourself at home? · Apply warm and dry compresses, a heating pad set on low, or a hot water bottle 3 or 4 times a day for pain. Keep a cloth between the heat source and your skin. · If your doctor prescribed antibiotics, take them as directed. Do not stop taking them just because you feel better. You need to take the full course of antibiotics. · Take pain medicines exactly as directed. ¨ If the doctor gave you a prescription medicine for pain, take it as prescribed. ¨ If you are not taking a prescription pain medicine, ask your doctor if you can take an over-the-counter medicine. · Keep your bandage clean and dry. Change the bandage whenever it gets wet or dirty, or at least one time a day. · If the abscess was packed with gauze:  ¨ Keep follow-up appointments to have the gauze changed or removed. If the doctor instructed you to remove the gauze, gently pull out all of the gauze when your doctor tells you to. ¨ After the gauze is removed, soak the area in warm water for 15 to 20 minutes 2 times a day, until the wound closes. When should you call for help?   Call your doctor now or seek immediate medical care if:  ? · You have signs of worsening infection, such as:  ¨ Increased pain, swelling, warmth, or redness. ¨ Red streaks leading from the infected skin. ¨ Pus draining from the wound. ¨ A fever. ? Watch closely for changes in your health, and be sure to contact your doctor if:  ? · You do not get better as expected. Where can you learn more? Go to http://bowen-grupo.info/. Enter V837 in the search box to learn more about \"Skin Abscess: Care Instructions. \"  Current as of: October 13, 2016  Content Version: 11.4  © 7657-6522 [employer]. Care instructions adapted under license by [employer] (which disclaims liability or warranty for this information). If you have questions about a medical condition or this instruction, always ask your healthcare professional. [place] [address] any warranty or liability for your use of this information. Cellulitis: Care Instructions  Your Care Instructions    Cellulitis is a skin infection. It often occurs after a break in the skin from a scrape, cut, bite, or puncture, or after a rash. The doctor has checked you carefully, but problems can develop later. If you notice any problems or new symptoms, get medical treatment right away. Follow-up care is a key part of your treatment and safety. Be sure to make and go to all appointments, and call your doctor if you are having problems. It's also a good idea to know your test results and keep a list of the medicines you take. How can you care for yourself at home? · Take your antibiotics as directed. Do not stop taking them just because you feel better. You need to take the full course of antibiotics. · Prop up the infected area on pillows to reduce pain and swelling. Try to keep the area above the level of your heart as often as you can.   · If your doctor told you how to care for your wound, follow your doctor's instructions. If you did not get instructions, follow this general advice:  ¨ Wash the wound with clean water 2 times a day. Don't use hydrogen peroxide or alcohol, which can slow healing. ¨ You may cover the wound with a thin layer of petroleum jelly, such as Vaseline, and a nonstick bandage. ¨ Apply more petroleum jelly and replace the bandage as needed. · Be safe with medicines. Take pain medicines exactly as directed. ¨ If the doctor gave you a prescription medicine for pain, take it as prescribed. ¨ If you are not taking a prescription pain medicine, ask your doctor if you can take an over-the-counter medicine. To prevent cellulitis in the future  · Try to prevent cuts, scrapes, or other injuries to your skin. Cellulitis most often occurs where there is a break in the skin. · If you get a scrape, cut, mild burn, or bite, wash the wound with clean water as soon as you can to help avoid infection. Don't use hydrogen peroxide or alcohol, which can slow healing. · If you have swelling in your legs (edema), support stockings and good skin care may help prevent leg sores and cellulitis. · Take care of your feet, especially if you have diabetes or other conditions that increase the risk of infection. Wear shoes and socks. Do not go barefoot. If you have athlete's foot or other skin problems on your feet, talk to your doctor about how to treat them. When should you call for help? Call your doctor now or seek immediate medical care if:  ? · You have signs that your infection is getting worse, such as:  ¨ Increased pain, swelling, warmth, or redness. ¨ Red streaks leading from the area. ¨ Pus draining from the area. ¨ A fever. ? · You get a rash. ? Watch closely for changes in your health, and be sure to contact your doctor if:  ? · You are not getting better after 1 day (24 hours). ? · You do not get better as expected. Where can you learn more?   Go to http://bowen-grupo.info/. Arash Braxton in the search box to learn more about \"Cellulitis: Care Instructions. \"  Current as of: October 13, 2016  Content Version: 11.4  © 5891-9047 [employer]. Care instructions adapted under license by "[employer] (which disclaims liability or warranty for this information). If you have questions about a medical condition or this instruction, always ask your healthcare professional. [place] [address] any warranty or liability for your use of this information. High INR Test Result: Care Instructions  Your Care Instructions    You had a blood test to check how long it takes your blood to clot. This test is called a PT or prothrombin time test. The result of the test is called the INR level. A high INR level can happen when you take warfarin (Coumadin). Warfarin helps prevent blood clots. To do this, it slows the amount of time it takes for your blood to clot. This raises your INR level. The INR goal for people who take warfarin is usually from 2 to 3. A value higher than 3.5 increases the risk of bleeding problems. Many things can affect the way warfarin works. Some natural health products and other medicines can make warfarin work too well. That can raise the risk of bleeding. If you drink a lot of alcohol, that may raise your INR. And severe diarrhea or vomiting can also raise your INR. The best way to lower your INR will depend on several things. In some cases, the doctor may have you stop taking warfarin for a few days. You may also be given other medicines to take. You will need to be tested often to make sure your INR level is going down. You will also need to watch for signs of bleeding. The doctor has checked you carefully, but problems can develop later. If you notice any problems or new symptoms, get medical treatment right away. Follow-up care is a key part of your treatment and safety.  Be sure to make and go to all appointments, and call your doctor if you are having problems. It's also a good idea to know your test results and keep a list of the medicines you take. How can you care for yourself at home? Be careful with medicines and foods  · Don't start or stop taking any medicines, vitamins, or natural remedies unless you first talk to your doctor. · Keep the amount of vitamin K in your diet about the same from day to day. Do not suddenly eat a lot more or a lot less food that is rich in vitamin K than you usually do. Vitamin K affects how warfarin works and how your blood clots. · Limit your use of alcohol. Avoid bleeding by preventing falls  · Wear slippers or shoes with nonskid soles. · Remove throw rugs and clutter. · Rearrange furniture and electrical cords to keep them out of walking paths. · Keep stairways, porches, and outside walkways well lit. Use night-lights in hallways and bathrooms. · Be extra careful when you work with sharp tools or knives. When should you call for help? Call 911 anytime you think you may need emergency care. For example, call if:  ? · You passed out (lost consciousness). ? · You have signs of severe bleeding, such as:  ¨ A severe headache that is different from past headaches. ¨ Vomiting blood or what looks like coffee grounds. ¨ Passing maroon or very bloody stools. ?Call your doctor now or seek immediate medical care if:  ? · You have unexpected bleeding, including:  ¨ Blood in stools or black stools that look like tar. ¨ Blood in your urine. ¨ Bruises or blood spots under the skin. ? · You feel dizzy or lightheaded. ? Watch closely for changes in your health, and be sure to contact your doctor if:  ? · You do not get better as expected. Where can you learn more? Go to http://bowen-grupo.info/. Enter C178 in the search box to learn more about \"High INR Test Result: Care Instructions. \"  Current as of: September 21, 2016  Content Version: 11.4  © 0059-3308 [employer]. Care instructions adapted under license by [employer] (which disclaims liability or warranty for this information). If you have questions about a medical condition or this instruction, always ask your healthcare professional. [address] any warranty or liability for your use of this information. Skin Abscess: Care Instructions  Your Care Instructions    A skin abscess is a bacterial infection that forms a pocket of pus. A boil is a kind of skin abscess. The doctor may have cut an opening in the abscess so that the pus can drain out. You may have gauze in the cut so that the abscess will stay open and keep draining. You may need antibiotics. You will need to follow up with your doctor to make sure the infection has gone away. The doctor has checked you carefully, but problems can develop later. If you notice any problems or new symptoms, get medical treatment right away. Follow-up care is a key part of your treatment and safety. Be sure to make and go to all appointments, and call your doctor if you are having problems. It's also a good idea to know your test results and keep a list of the medicines you take. How can you care for yourself at home? · Apply warm and dry compresses, a heating pad set on low, or a hot water bottle 3 or 4 times a day for pain. Keep a cloth between the heat source and your skin. · If your doctor prescribed antibiotics, take them as directed. Do not stop taking them just because you feel better. You need to take the full course of antibiotics. · Take pain medicines exactly as directed. ¨ If the doctor gave you a prescription medicine for pain, take it as prescribed. ¨ If you are not taking a prescription pain medicine, ask your doctor if you can take an over-the-counter medicine. · Keep your bandage clean and dry.  Change the bandage whenever it gets wet or dirty, or at least one time a day.  · If the abscess was packed with gauze:  ¨ Keep follow-up appointments to have the gauze changed or removed. If the doctor instructed you to remove the gauze, gently pull out all of the gauze when your doctor tells you to. ¨ After the gauze is removed, soak the area in warm water for 15 to 20 minutes 2 times a day, until the wound closes. When should you call for help? Call your doctor now or seek immediate medical care if:  ? · You have signs of worsening infection, such as:  ¨ Increased pain, swelling, warmth, or redness. ¨ Red streaks leading from the infected skin. ¨ Pus draining from the wound. ¨ A fever. ? Watch closely for changes in your health, and be sure to contact your doctor if:  ? · You do not get better as expected. Where can you learn more? Go to http://bowen-grupo.info/. Enter D268 in the search box to learn more about \"Skin Abscess: Care Instructions. \"  Current as of: October 13, 2016  Content Version: 11.4  © 0315-4461 [employer]. Care instructions adapted under license by [employer] (which disclaims liability or warranty for this information). If you have questions about a medical condition or this instruction, always ask your healthcare professional. [address] any warranty or liability for your use of this information. High INR Test Result: Care Instructions  Your Care Instructions  You had a blood test to check how long it takes your blood to clot. This test is called a PT or prothrombin time test. The result of the test is called the INR level. A high INR level can happen when you take warfarin (Coumadin). Warfarin helps prevent blood clots. To do this, it slows the amount of time it takes for your blood to clot. This raises your INR level. The INR goal for people who take warfarin is usually from 2 to 3.5. A value higher than 3.5 increases the risk of bleeding problems.   Many things can affect the way warfarin works. Some natural health products and other medicines can make warfarin work too well. That can raise the risk of bleeding. If you drink a lot of alcohol, that may raise your INR. And severe diarrhea or vomiting can also raise your INR. The best way to lower your INR will depend on several things. In some cases, the doctor may have you stop taking warfarin for a few days. You may also be given other medicines to take. You will need to be tested often to make sure your INR level is going down. You will also need to watch for signs of bleeding. The doctor has checked you carefully, but problems can develop later. If you notice any problems or new symptoms, get medical treatment right away. Follow-up care is a key part of your treatment and safety. Be sure to make and go to all appointments, and call your doctor if you are having problems. It's also a good idea to know your test results and keep a list of the medicines you take. How can you care for yourself at home? Be careful with medicines and foods  · Don't start or stop taking any medicines, vitamins, or natural remedies unless you first talk to your doctor. · Keep the amount of vitamin K in your diet about the same from day to day. Do not suddenly eat a lot more or a lot less food that is rich in vitamin K than you usually do. Vitamin K affects how warfarin works and how your blood clots. · Avoid cranberry juice and other cranberry products. They can increase the effects of warfarin. · Limit your use of alcohol. Avoid bleeding by preventing falls  · Wear slippers or shoes with nonskid soles. · Remove throw rugs and clutter. · Rearrange furniture and electrical cords to keep them out of walking paths. · Keep stairways, porches, and outside walkways well lit. Use night-lights in hallways and bathrooms. · Be extra careful when you work with sharp tools or knives. When should you call for help?   Call 911 anytime you think you may need emergency care. For example, call if:  · You have a sudden, severe headache that is different from past headaches. Call your doctor now or seek immediate medical care if:  · You have any abnormal bleeding, such as:  ¨ Nosebleeds. ¨ Vaginal bleeding that is different (heavier, more frequent, at a different time of the month) than what you are used to. ¨ Bloody or black stools, or rectal bleeding. ¨ Bloody or pink urine. Watch closely for changes in your health, and be sure to contact your doctor if you have any problems. Where can you learn more? Go to [employer]  Enter C178 in the search box to learn more about \"High INR Test Result: Care Instructions. \"   © 4122-8699 [employer]. Care instructions adapted under license by [employer] (which disclaims liability or warranty for this information). This care instruction is for use with your licensed healthcare professional. If you have questions about a medical condition or this instruction, always ask your healthcare professional. [place] [address] any warranty or liability for your use of this information. Content Version: 52.0.093053; Current as of: November 20, 2015             Taking Warfarin Safely: Care Instructions  Your Care Instructions    Warfarin is a medicine that you take to prevent blood clots. It is often called a blood thinner. Doctors give warfarin (such as Coumadin) to reduce the risk of blood clots. You may be at risk for blood clots if you have atrial fibrillation or deep vein thrombosis. Some other health problems may also put you at risk. Warfarin slows the amount of time it takes for your blood to clot. It can cause bleeding problems. Even if you've been taking warfarin for a while, it's important to know how to take it safely. Foods and other medicines can affect the way warfarin works. Some can make warfarin work too well. This can cause bleeding problems.  And some can make it work poorly, so that it does not prevent blood clots very well. You will need regular blood tests to check how long it takes for your blood to form a clot. This test is called a PT or prothrombin time test. The result of the test is called an INR level. Depending on the test results, your doctor or anticoagulation clinic may adjust your dose of warfarin. Follow-up care is a key part of your treatment and safety. Be sure to make and go to all appointments, and call your doctor if you are having problems. It's also a good idea to know your test results and keep a list of the medicines you take. How can you care for yourself at home? Take warfarin safely  · Take your warfarin at the same time each day. · If you miss a dose of warfarin, don't take an extra dose to make up for it. Your doctor can tell you exactly what to do so you don't take too much or too little. · Wear medical alert jewelry that lets others know that you take warfarin. You can buy this at most drugstores. · Don't take warfarin if you are pregnant or planning to get pregnant. Talk to your doctor about how you can prevent getting pregnant while you are taking it. · Don't change your dose or stop taking warfarin unless your doctor tells you to. Effects of medicines and food on warfarin  · Don't start or stop taking any medicines, vitamins, or natural remedies unless you first talk to your doctor. Many medicines can affect how warfarin works. These include aspirin and other pain relievers, over-the-counter medicines, multivitamins, dietary supplements, and herbal products. · Tell all of your doctors and pharmacists that you take warfarin. Some prescription medicines can affect how warfarin works. · Keep the amount of vitamin K in your diet about the same from day to day. Do not suddenly eat a lot more or a lot less food that is rich in vitamin K than you usually do. Vitamin K affects how warfarin works and how your blood clots.  Talk with your doctor before making big changes in your diet. Foods that have a lot of vitamin K include cooked green vegetables, such as:  ¨ Kale, spinach, turnip greens, rhonda greens, Swiss chard, and mustard greens. ¨ [place] sprouts, broccoli, and asparagus. · Limit your use of alcohol. Avoid bleeding by preventing falls and injuries  · Wear slippers or shoes with nonskid soles. · Remove throw rugs and clutter. · Rearrange furniture and electrical cords to keep them out of walking paths. · Keep stairways, porches, and outside walkways well lit. Use night-lights in hallways and bathrooms. · Be extra careful when you work with sharp tools or knives. When should you call for help? Call 911 anytime you think you may need emergency care. For example, call if:  ? · You have a sudden, severe headache that is different from past headaches. ?Call your doctor now or seek immediate medical care if:  ? · You have any abnormal bleeding, such as:  ¨ Nosebleeds. ¨ Vaginal bleeding that is different (heavier, more frequent, at a different time of the month) than what you are used to. ¨ Bloody or black stools, or rectal bleeding. ¨ Bloody or pink urine. ? Watch closely for changes in your health, and be sure to contact your doctor if you have any problems. Where can you learn more? Go to http://bowen-grupo.info/. Enter Y130 in the search box to learn more about \"Taking Warfarin Safely: Care Instructions. \"  Current as of: September 21, 2016  Content Version: 11.4  © 2285-2363 [employer]. Care instructions adapted under license by [employer] (which disclaims liability or warranty for this information). If you have questions about a medical condition or this instruction, always ask your healthcare professional. [address] any warranty or liability for your use of this information.

## 2017-11-01 LAB
BACTERIA SPEC CULT: ABNORMAL
SERVICE CMNT-IMP: ABNORMAL

## 2017-11-02 ENCOUNTER — HOSPITAL ENCOUNTER (OUTPATIENT)
Dept: WOUND CARE | Age: 67
Discharge: HOME | End: 2017-11-02
Payer: MEDICARE

## 2017-11-02 LAB
BACTERIA SPEC CULT: NORMAL
SERVICE CMNT-IMP: NORMAL

## 2017-11-02 PROCEDURE — 97602 WOUND(S) CARE NON-SELECTIVE: CPT

## 2017-11-06 PROCEDURE — 29580 STRAPPING UNNA BOOT: CPT

## 2017-11-10 PROCEDURE — 29580 STRAPPING UNNA BOOT: CPT

## 2017-11-10 PROCEDURE — 11042 DBRDMT SUBQ TIS 1ST 20SQCM/<: CPT

## 2017-11-13 PROCEDURE — 97602 WOUND(S) CARE NON-SELECTIVE: CPT

## 2017-11-16 PROCEDURE — 97602 WOUND(S) CARE NON-SELECTIVE: CPT

## 2017-11-22 PROCEDURE — 97602 WOUND(S) CARE NON-SELECTIVE: CPT

## 2017-11-28 ENCOUNTER — OFFICE VISIT (OUTPATIENT)
Dept: VASCULAR SURGERY | Age: 67
End: 2017-11-28

## 2017-11-28 VITALS
WEIGHT: 293 LBS | BODY MASS INDEX: 53.92 KG/M2 | DIASTOLIC BLOOD PRESSURE: 70 MMHG | HEART RATE: 68 BPM | HEIGHT: 62 IN | SYSTOLIC BLOOD PRESSURE: 138 MMHG | RESPIRATION RATE: 18 BRPM

## 2017-11-28 DIAGNOSIS — I89.0 LYMPHEDEMA: Primary | ICD-10-CM

## 2017-11-28 PROCEDURE — 97602 WOUND(S) CARE NON-SELECTIVE: CPT

## 2017-11-28 NOTE — PROGRESS NOTES
Awais Riley    Chief Complaint   Patient presents with    Leg Pain    Swelling       History and Physical    Mrs. Vijay Anderson returns to our office for continued management of her leg swelling and left knee wound. Mrs. Vijay Anderson stated she was recently in the hospital at the end of October for an infection in her knee joint due to the wound. She stated they washed out the knee and gave her antibiotics. She has not been wearing compression because of her knee wound. She has been seeing wound care for her dressing changes, her next appointment is today. Past Medical History:   Diagnosis Date    Depression     Hypertension     Thromboembolus [facility])      Patient Active Problem List   Diagnosis Code    Morbid obesity ([facility] [address] E66.01    Venous reflux I87.2    Leg swelling M79.89    Cellulitis of left knee L03. 116    Cellulitis of left lower extremity L03. 80    Anticoagulated on Coumadin Z51.81, Z79.01    Abscess of knee, left L02.416    History of pulmonary embolism Z86.711     Past Surgical History:   Procedure Laterality Date    ABDOMEN SURGERY PROC UNLISTED      HX CHOLECYSTECTOMY      HX ORTHOPAEDIC       Current Outpatient Prescriptions   Medication Sig Dispense Refill    warfarin (COUMADIN) 3 mg tablet Take 1 Tab by mouth daily. 30 Tab 0    escitalopram oxalate (LEXAPRO) 10 mg tablet TAKE 1 TABLET (10 MG TOTAL) BY MOUTH DAILY.  ferrous sulfate 325 mg (65 mg iron) tablet Take 325 mg by mouth.  metoprolol succinate (TOPROL XL) 25 mg XL tablet Take 25 mg by mouth two (2) times a day.  cholecalciferol, vitamin D3, 2,000 unit tab Take 2,000 Units by mouth.  albuterol (VENTOLIN HFA) 90 mcg/actuation inhaler VENTOLIN  (90 Base) MCG/ACT AERS      cyanocobalamin (VITAMIN B-12) 1,000 mcg sublingual tablet Take 1,000 mcg by mouth daily.  amoxicillin-clavulanate (AUGMENTIN) 875-125 mg per tablet Take 1 Tab by mouth two (2) times a day.  28 Tab 0     No Known Allergies  Social History     Social History    Marital status:      Spouse name: N/A    Number of children: N/A    Years of education: N/A     Occupational History    Not on file. Social History Main Topics    Smoking status: Never Smoker    Smokeless tobacco: Never Used    Alcohol use Yes    Drug use: No    Sexual activity: Not Currently     Other Topics Concern    Not on file     Social History Narrative      Family History   Problem Relation Age of Onset    Diabetes Mother     Hypertension Mother     Cancer Father     Heart Disease Father        Review of Systems    Review of Systems   Constitutional: Negative for chills, fever, malaise/fatigue and weight loss. HENT: Negative for ear pain and hearing loss. Eyes: Negative for blurred vision, pain and discharge. Respiratory: Negative for cough, hemoptysis and sputum production. Cardiovascular: Positive for leg swelling. Negative for chest pain, palpitations and orthopnea. Gastrointestinal: Negative for heartburn, nausea and vomiting. Genitourinary: Negative for dysuria and urgency. Neurological: Negative for dizziness, tingling, weakness and headaches. Endo/Heme/Allergies: Does not bruise/bleed easily. Psychiatric/Behavioral: Negative for depression.             Physical Exam:    Visit Vitals    /70 (BP 1 Location: Left arm, BP Patient Position: Sitting)    Pulse 68    Resp 18    Ht 5' 2\" (1.575 m)    Wt 315 lb (142.9 kg)    BMI 57.61 kg/m2      Physical Examination: General appearance - alert, well appearing, and in no distress  Mental status - alert, oriented to person, place, and time, normal mood, behavior, speech, dress, motor activity, and thought processes  Eyes - left eye normal, right eye normal  Ears - right ear normal, left ear normal  Mouth - mucous membranes moist  Chest - no wheezes  Heart - normal rate and regular rhythm  Extremities - feet warm, bilateral lower extremity swelling, left knee bandage in place, + lipodermatosclerosis  Skin - red and thickened bilateral shins, heels extremely dry      Impression and Plan:    ICD-10-CM ICD-9-CM    1. Lymphedema I89.0 457.1      No orders of the defined types were placed in this encounter. We discussed the benefits of compression with Mrs. Rajesh Timmons. We will place tubigrip bilaterally today and we recommend compression stockings be worn every day. We recommend [address] as they are able to measure and obtain the accurate size. We will request that wound care to attempt to dress her wound in a way that the dressing does not go below her knee as the compression stockings may interfere with the wound dressing. We will see Mrs. Rajesh Timmons again in two weeks. I have reviewed the documentation and am in agreement with plan above. I believe part of her issue with wound healing is her lymphedema and recurrent cellulitis. I believe keeping her edema control will be essential for her to heal.      Follow-up Disposition:  Return in about 2 weeks (around 12/12/2017). The treatment plan was reviewed with the patient in detail. The patient voiced understanding of this plan and all questions and concerns were addressed. The patient agrees with this plan. We discussed the signs and symptoms that would require earlier attention or intervention. The patient was given educational material related to his/her visit and the patient has voiced understanding of the material.     I appreciate the opportunity to participate in the care of your patient. I will be sure to keep you informed of any subsequent changes in the treatment plan. If you have any questions or concerns, please feel free to contact me. Rocky Jiang NP    PLEASE NOTE:  This document has been produced using voice recognition software. Unrecognized errors in transcription may be present.

## 2017-11-28 NOTE — MR AVS SNAPSHOT
Visit Information Date & Time Provider Department Dept. Phone Encounter #  
 11/28/2017  9:00 AM Ashtyn Crenshaw MD [facility] [address] [id number] Upcoming Health Maintenance Date Due Hepatitis C Screening 1950 DTaP/Tdap/Td series (1 - Tdap) 10/29/1970 BREAST CANCER SCRN MAMMOGRAM 10/29/1999 FOBT Q 1 YEAR AGE 50-75 10/29/1999 ZOSTER VACCINE AGE 60> 8/29/2009 GLAUCOMA SCREENING Q2Y 10/29/2014 OSTEOPOROSIS SCREENING (DEXA) 10/29/2014 MEDICARE YEARLY EXAM 10/29/2014 Pneumococcal 65+ Low/Medium Risk (2 of 2 - PPSV23) 11/30/2017 Allergies as of 11/28/2017  Review Complete On: 11/28/2017 By: Inga Rosario NP No Known Allergies Current Immunizations  Reviewed on 10/30/2017 Name Date Influenza High Dose Vaccine PF 10/3/2017 12:00 AM, 10/19/2016 12:00 AM  
 Influenza Vaccine 10/28/2015 12:00 AM, 10/15/2013 12:00 AM  
 Pneumococcal Conjugate (PCV-13) 11/30/2016 12:00 AM  
  
 Not reviewed this visit Vitals BP Pulse Resp Height(growth percentile) Weight(growth percentile) BMI  
 138/70 (BP 1 Location: Left arm, BP Patient Position: Sitting) 68 18 5' 2\" (1.575 m) 315 lb (142.9 kg) 57.61 kg/m2 OB Status Smoking Status Postmenopausal Never Smoker BMI and BSA Data Body Mass Index Body Surface Area  
 57.61 kg/m 2 2.5 m 2 Preferred Pharmacy Pharmacy Name Phone [id number] [place], [address] [place] Your Updated Medication List  
  
   
This list is accurate as of: 11/28/17  9:23 AM.  Always use your most recent med list.  
  
  
  
  
 amoxicillin-clavulanate 875-125 mg per tablet Commonly known as:  AUGMENTIN Take 1 Tab by mouth two (2) times a day. cholecalciferol (vitamin D3) 2,000 unit Tab Take 2,000 Units by mouth.  
  
 escitalopram oxalate 10 mg tablet Commonly known as:  Elmo Ag TAKE 1 TABLET (10 MG TOTAL) BY MOUTH DAILY. ferrous sulfate 325 mg (65 mg iron) tablet Take 325 mg by mouth. TOPROL XL 25 mg XL tablet Generic drug:  metoprolol succinate Take 25 mg by mouth two (2) times a day. VENTOLIN HFA 90 mcg/actuation inhaler Generic drug:  albuterol VENTOLIN  (90 Base) MCG/ACT AERS  
  
 VITAMIN B-12 1,000 mcg sublingual tablet Generic drug:  cyanocobalamin Take 1,000 mcg by mouth daily. warfarin 3 mg tablet Commonly known as:  COUMADIN Take 1 Tab by mouth daily. To-Do List   
 12/01/2017 10:30 AM  
  Appointment with WOUND NURSE at [facility] ([phone]) Please provide this summary of care documentation to your next provider. Your primary care clinician is listed as Emilia Wang. If you have any questions after today's visit, please call [phone].

## 2017-12-01 ENCOUNTER — HOSPITAL ENCOUNTER (OUTPATIENT)
Dept: WOUND CARE | Age: 67
Discharge: HOME | End: 2017-12-01
Payer: MEDICARE

## 2017-12-01 PROCEDURE — 97602 WOUND(S) CARE NON-SELECTIVE: CPT

## 2017-12-04 PROCEDURE — 97602 WOUND(S) CARE NON-SELECTIVE: CPT

## 2017-12-07 PROCEDURE — 97602 WOUND(S) CARE NON-SELECTIVE: CPT

## 2017-12-12 ENCOUNTER — OFFICE VISIT (OUTPATIENT)
Dept: VASCULAR SURGERY | Age: 67
End: 2017-12-12

## 2017-12-12 VITALS
RESPIRATION RATE: 18 BRPM | HEART RATE: 70 BPM | BODY MASS INDEX: 53.92 KG/M2 | SYSTOLIC BLOOD PRESSURE: 132 MMHG | WEIGHT: 293 LBS | DIASTOLIC BLOOD PRESSURE: 70 MMHG | HEIGHT: 62 IN

## 2017-12-12 DIAGNOSIS — I89.0 LYMPHEDEMA: Primary | ICD-10-CM

## 2017-12-12 DIAGNOSIS — E66.01 MORBID OBESITY (HCC): ICD-10-CM

## 2017-12-12 DIAGNOSIS — I87.2 VENOUS REFLUX: ICD-10-CM

## 2017-12-12 NOTE — PROGRESS NOTES
Mery Elam    Chief Complaint   Patient presents with    Leg Pain       History and Physical    Mrs. Svetlana Lamar is a 75year old female who returns to us for follow up evaluation of her leg swelling. She states her leg swelling is unchanged, however, her left knee wound has healed almost completely. She was unable to obtain compression stockings but she intends to get some soon. She is going to the wound clinic several times per week for the left knee wound, her next appointment is Friday. She thinks she will only have a couple more appointments at the wound clinic because her wound is almost healed. Past Medical History:   Diagnosis Date    Depression     Hypertension     Thromboembolus [facility])      Patient Active Problem List   Diagnosis Code    [facility] E66.01    Venous reflux I87.2    Leg swelling M79.89    Cellulitis of left knee L03. 116    Cellulitis of left lower extremity L03. 80    Anticoagulated on Coumadin Z51.81, Z79.01    Abscess of knee, left L02.416    History of pulmonary embolism Z86.711     Past Surgical History:   Procedure Laterality Date    ABDOMEN SURGERY PROC UNLISTED      HX CHOLECYSTECTOMY      HX ORTHOPAEDIC       Current Outpatient Prescriptions   Medication Sig Dispense Refill    warfarin (COUMADIN) 3 mg tablet Take 1 Tab by mouth daily. 30 Tab 0    amoxicillin-clavulanate (AUGMENTIN) 875-125 mg per tablet Take 1 Tab by mouth two (2) times a day. 28 Tab 0    escitalopram oxalate (LEXAPRO) 10 mg tablet TAKE 1 TABLET (10 MG TOTAL) BY MOUTH DAILY.  ferrous sulfate 325 mg (65 mg iron) tablet Take 325 mg by mouth.  metoprolol succinate (TOPROL XL) 25 mg XL tablet Take 25 mg by mouth two (2) times a day.  cholecalciferol, vitamin D3, 2,000 unit tab Take 2,000 Units by mouth.       albuterol (VENTOLIN HFA) 90 mcg/actuation inhaler VENTOLIN  (90 Base) MCG/ACT AERS      cyanocobalamin (VITAMIN B-12) 1,000 mcg sublingual tablet Take 1,000 mcg by mouth daily. No Known Allergies  Social History     Social History    Marital status:      Spouse name: N/A    Number of children: N/A    Years of education: N/A     Occupational History    Not on file. Social History Main Topics    Smoking status: Never Smoker    Smokeless tobacco: Never Used    Alcohol use Yes    Drug use: No    Sexual activity: Not Currently     Other Topics Concern    Not on file     Social History Narrative      Family History   Problem Relation Age of Onset    Diabetes Mother     Hypertension Mother     Cancer Father     Heart Disease Father        Review of Systems    Review of Systems   Constitutional: Negative for chills, fever, malaise/fatigue and weight loss. HENT: Negative for ear pain and hearing loss. Eyes: Negative for blurred vision, pain and discharge. Respiratory: Negative for cough, hemoptysis and sputum production. Cardiovascular: Positive for leg swelling. Negative for chest pain, palpitations and orthopnea. Gastrointestinal: Negative for heartburn, nausea and vomiting. Genitourinary: Negative for dysuria and urgency. Skin: Negative for itching and rash. Neurological: Negative for dizziness, tingling, weakness and headaches. Endo/Heme/Allergies: Does not bruise/bleed easily. Psychiatric/Behavioral: Negative for depression.             Physical Exam:    Visit Vitals    /70 (BP 1 Location: Left arm, BP Patient Position: Sitting)    Pulse 70    Resp 18    Ht 5' 2\" (1.575 m)    Wt 315 lb (142.9 kg)    BMI 57.61 kg/m2      Physical Examination: General appearance - alert, well appearing, and in no distress  Mental status - alert, oriented to person, place, and time, normal mood, behavior, speech, dress, motor activity, and thought processes  Eyes - left eye normal, right eye normal  Ears - right ear normal, left ear normal  Mouth - mucous membranes moist  Chest - no wheezes  Heart - regular rate and rhythm  Musculoskeletal - no muscular tenderness noted  Extremities - peripheral pulses normal, 3+ bilateral lower extremity edema, healing wound to medial knee area  Skin - lower extremity skin dark and thickened, heels extremely dry,     Impression and Plan:    ICD-10-CM ICD-9-CM    1. Lymphedema I89.0 457.1    2. Morbid obesity ([facility] [address] E66.01 278.01    3. Venous reflux I87.2 459.81      No orders of the defined types were placed in this encounter. Mrs. Henrietta Gandhi left knee wound is healing nicely. There is still a small scabbed area and a pinpoint open area. I explained to Mrs. Samreen Winn that she needs to be wearing compression to help with the leg swelling. I recommended [facility] to her again and she verbalized understanding. After the wound is healed we can consider a lymphedema clinic but compression is essential.  We will see Mrs. Samreen Winn again after the holiday in about 3 weeks. Follow-up Disposition:  Return in about 3 weeks (around 1/2/2018). The treatment plan was reviewed with the patient in detail. The patient voiced understanding of this plan and all questions and concerns were addressed. The patient agrees with this plan. We discussed the signs and symptoms that would require earlier attention or intervention. The patient was given educational material related to his/her visit and the patient has voiced understanding of the material.     I appreciate the opportunity to participate in the care of your patient. I will be sure to keep you informed of any subsequent changes in the treatment plan. If you have any questions or concerns, please feel free to contact me. Dionna Pan NP    PLEASE NOTE:  This document has been produced using voice recognition software. Unrecognized errors in transcription may be present.

## 2017-12-12 NOTE — MR AVS SNAPSHOT
Visit Information Date & Time Provider Department Dept. Phone Encounter #  
 12/12/2017 11:30 AM Rocky Jiang NP [facility] [address] [id number] Your Appointments 12/12/2017 11:30 AM  
Follow Up with Rocky Jiang NP  
[facility] ([facility] SCHEDULING) Appt Note: 2 weeks fup without studies; confirmed appt with patient [place] [id number] Upcoming Health Maintenance Date Due Hepatitis C Screening 1950 DTaP/Tdap/Td series (1 - Tdap) 10/29/1970 BREAST CANCER SCRN MAMMOGRAM 10/29/1999 FOBT Q 1 YEAR AGE 50-75 10/29/1999 ZOSTER VACCINE AGE 60> 8/29/2009 GLAUCOMA SCREENING Q2Y 10/29/2014 OSTEOPOROSIS SCREENING (DEXA) 10/29/2014 MEDICARE YEARLY EXAM 10/29/2014 Pneumococcal 65+ Low/Medium Risk (2 of 2 - PPSV23) 11/30/2017 Allergies as of 12/12/2017  Review Complete On: 12/12/2017 By: Sadaf Paniagua RN No Known Allergies Current Immunizations  Reviewed on 10/30/2017 Name Date Influenza High Dose Vaccine PF 10/3/2017 12:00 AM, 10/19/2016 12:00 AM  
 Influenza Vaccine 10/28/2015 12:00 AM, 10/15/2013 12:00 AM  
 Pneumococcal Conjugate (PCV-13) 11/30/2016 12:00 AM  
  
 Not reviewed this visit Vitals BP Pulse Resp Height(growth percentile) Weight(growth percentile) BMI  
 132/70 (BP 1 Location: Left arm, BP Patient Position: Sitting) 70 18 5' 2\" (1.575 m) 315 lb (142.9 kg) 57.61 kg/m2 OB Status Smoking Status Postmenopausal Never Smoker BMI and BSA Data Body Mass Index Body Surface Area  
 57.61 kg/m 2 2.5 m 2 Preferred Pharmacy Pharmacy Name Phone [id number] [place], [address] [place] Your Updated Medication List  
  
   
 This list is accurate as of: 12/12/17 11:20 AM.  Always use your most recent med list.  
  
  
  
  
 amoxicillin-clavulanate 875-125 mg per tablet Commonly known as:  AUGMENTIN Take 1 Tab by mouth two (2) times a day. cholecalciferol (vitamin D3) 2,000 unit Tab Take 2,000 Units by mouth.  
  
 escitalopram oxalate 10 mg tablet Commonly known as:  Saurav Legacy TAKE 1 TABLET (10 MG TOTAL) BY MOUTH DAILY. ferrous sulfate 325 mg (65 mg iron) tablet Take 325 mg by mouth. TOPROL XL 25 mg XL tablet Generic drug:  metoprolol succinate Take 25 mg by mouth two (2) times a day. VENTOLIN HFA 90 mcg/actuation inhaler Generic drug:  albuterol VENTOLIN  (90 Base) MCG/ACT AERS  
  
 VITAMIN B-12 1,000 mcg sublingual tablet Generic drug:  cyanocobalamin Take 1,000 mcg by mouth daily. warfarin 3 mg tablet Commonly known as:  COUMADIN Take 1 Tab by mouth daily. Please provide this summary of care documentation to your next provider. Your primary care clinician is listed as Deep Terrell. If you have any questions after today's visit, please call [phone].

## 2017-12-15 PROCEDURE — 99211 OFF/OP EST MAY X REQ PHY/QHP: CPT

## 2017-12-19 ENCOUNTER — DOCUMENTATION ONLY (OUTPATIENT)
Dept: VASCULAR SURGERY | Age: 67
End: 2017-12-19

## 2017-12-19 NOTE — PROGRESS NOTES
[facility] called to verify prescription for compression stockings and gave date of November 29, 2017.

## 2018-06-13 ENCOUNTER — HOSPITAL ENCOUNTER (EMERGENCY)
Age: 68
Discharge: HOME | End: 2018-06-13
Attending: EMERGENCY MEDICINE
Payer: MEDICARE

## 2018-06-13 VITALS
OXYGEN SATURATION: 98 % | RESPIRATION RATE: 18 BRPM | BODY MASS INDEX: 53.92 KG/M2 | HEART RATE: 95 BPM | WEIGHT: 293 LBS | DIASTOLIC BLOOD PRESSURE: 83 MMHG | TEMPERATURE: 97.9 F | HEIGHT: 62 IN | SYSTOLIC BLOOD PRESSURE: 150 MMHG

## 2018-06-13 DIAGNOSIS — I89.0 LYMPHEDEMA OF BOTH LOWER EXTREMITIES: ICD-10-CM

## 2018-06-13 DIAGNOSIS — L03.116 CELLULITIS OF LEFT LOWER EXTREMITY: Primary | ICD-10-CM

## 2018-06-13 LAB
ANION GAP SERPL CALC-SCNC: 12 MMOL/L (ref 3–18)
BASOPHILS # BLD: 0 K/UL (ref 0–0.06)
BASOPHILS NFR BLD: 1 % (ref 0–2)
BUN SERPL-MCNC: 17 MG/DL (ref 7–18)
BUN/CREAT SERPL: 15 (ref 12–20)
CALCIUM SERPL-MCNC: 8.5 MG/DL (ref 8.5–10.1)
CHLORIDE SERPL-SCNC: 104 MMOL/L (ref 100–108)
CO2 SERPL-SCNC: 27 MMOL/L (ref 21–32)
CREAT SERPL-MCNC: 1.13 MG/DL (ref 0.6–1.3)
DIFFERENTIAL METHOD BLD: ABNORMAL
EOSINOPHIL # BLD: 0.2 K/UL (ref 0–0.4)
EOSINOPHIL NFR BLD: 3 % (ref 0–5)
ERYTHROCYTE [DISTWIDTH] IN BLOOD BY AUTOMATED COUNT: 15.6 % (ref 11.6–14.5)
GLUCOSE SERPL-MCNC: 107 MG/DL (ref 74–99)
HCT VFR BLD AUTO: 44.2 % (ref 35–45)
HGB BLD-MCNC: 13.8 G/DL (ref 12–16)
INR PPP: 0.9 (ref 0.8–1.2)
LYMPHOCYTES # BLD: 1.9 K/UL (ref 0.9–3.6)
LYMPHOCYTES NFR BLD: 26 % (ref 21–52)
MCH RBC QN AUTO: 26.6 PG (ref 24–34)
MCHC RBC AUTO-ENTMCNC: 31.2 G/DL (ref 31–37)
MCV RBC AUTO: 85.3 FL (ref 74–97)
MONOCYTES # BLD: 0.6 K/UL (ref 0.05–1.2)
MONOCYTES NFR BLD: 9 % (ref 3–10)
NEUTS SEG # BLD: 4.5 K/UL (ref 1.8–8)
NEUTS SEG NFR BLD: 61 % (ref 40–73)
PLATELET # BLD AUTO: 202 K/UL (ref 135–420)
PMV BLD AUTO: 9.6 FL (ref 9.2–11.8)
POTASSIUM SERPL-SCNC: 4.4 MMOL/L (ref 3.5–5.5)
PROTHROMBIN TIME: 12.1 SEC (ref 11.5–15.2)
RBC # BLD AUTO: 5.18 M/UL (ref 4.2–5.3)
SODIUM SERPL-SCNC: 143 MMOL/L (ref 136–145)
WBC # BLD AUTO: 7.2 K/UL (ref 4.6–13.2)

## 2018-06-13 PROCEDURE — 80048 BASIC METABOLIC PNL TOTAL CA: CPT | Performed by: EMERGENCY MEDICINE

## 2018-06-13 PROCEDURE — 99282 EMERGENCY DEPT VISIT SF MDM: CPT

## 2018-06-13 PROCEDURE — 85610 PROTHROMBIN TIME: CPT | Performed by: EMERGENCY MEDICINE

## 2018-06-13 PROCEDURE — 85025 COMPLETE CBC W/AUTO DIFF WBC: CPT | Performed by: EMERGENCY MEDICINE

## 2018-06-13 RX ORDER — TRAMADOL HYDROCHLORIDE 50 MG/1
50 TABLET ORAL
Qty: 12 TAB | Refills: 0 | Status: SHIPPED | OUTPATIENT
Start: 2018-06-13

## 2018-06-13 RX ORDER — CEPHALEXIN 500 MG/1
500 CAPSULE ORAL 4 TIMES DAILY
Qty: 28 CAP | Refills: 0 | Status: SHIPPED | OUTPATIENT
Start: 2018-06-13 | End: 2018-06-20

## 2018-06-13 NOTE — ED PROVIDER NOTES
EMERGENCY DEPARTMENT HISTORY AND PHYSICAL EXAM    Date: 6/13/2018  Patient Name: Guillermo Bender    History of Presenting Illness     Chief Complaint   Patient presents with    Leg Pain         History Provided By: Patient    Chief Complaint: Bilateral leg pain  Duration: 1.5 months   Timing:  Worsening  Location: Bilateral lower legs  Quality: Shooting  Severity: 8 out of 10  Associated Symptoms: erythema to BLE and weeping from skin changes to BLE    Additional History (Context):   6:00 AM  Guillermo Bender is a 75 y.o. female with PMHx of lymphedema and venous reflux who presents to the emergency department C/O shooting bilateral leg pain (rated 8/10), onset 1.5 months ago. Associated sxs include erythema to BLE and weeping from skin changes to BLE. Pt states \"I've had cellulitis for 20 years. \" Pt reports that she was being seen by a wound care clinic for her lower extremities x 9 months, last treatment was in November where she reports \"it cleared up. \" Last PCP appointment was 9 months ago. Pt denies fever, chills, or any other sxs or complaints. PCP: Carlyn Maya, DO    Current Outpatient Prescriptions   Medication Sig Dispense Refill    traMADol (ULTRAM) 50 mg tablet Take 1 Tab by mouth every six (6) hours as needed for Pain. Max Daily Amount: 200 mg. 12 Tab 0    cephALEXin (KEFLEX) 500 mg capsule Take 1 Cap by mouth four (4) times daily for 7 days. 28 Cap 0    warfarin (COUMADIN) 3 mg tablet Take 1 Tab by mouth daily. 30 Tab 0    escitalopram oxalate (LEXAPRO) 10 mg tablet TAKE 1 TABLET (10 MG TOTAL) BY MOUTH DAILY.  ferrous sulfate 325 mg (65 mg iron) tablet Take 325 mg by mouth.  metoprolol succinate (TOPROL XL) 25 mg XL tablet Take 25 mg by mouth two (2) times a day.  cholecalciferol, vitamin D3, 2,000 unit tab Take 2,000 Units by mouth.       albuterol (VENTOLIN HFA) 90 mcg/actuation inhaler VENTOLIN  (90 Base) MCG/ACT AERS      cyanocobalamin (VITAMIN B-12) 1,000 mcg sublingual tablet Take 1,000 mcg by mouth daily. Past History     Past Medical History:  Past Medical History:   Diagnosis Date    Depression     Hypertension     Thromboembolus [facility])        Past Surgical History:  Past Surgical History:   Procedure Laterality Date    ABDOMEN SURGERY PROC UNLISTED      HX CHOLECYSTECTOMY      HX ORTHOPAEDIC         Family History:  Family History   Problem Relation Age of Onset    Diabetes Mother     Hypertension Mother     Cancer Father     Heart Disease Father        Social History:  Social History   Substance Use Topics    Smoking status: Never Smoker    Smokeless tobacco: Never Used    Alcohol use Yes       Allergies:  No Known Allergies      Review of Systems   Review of Systems   Constitutional: Negative for chills and fever. Musculoskeletal: Positive for myalgias (bilateral lower legs). Skin: Positive for color change (erythema to BLE). (+) weeping from skin changes to BLE   All other systems reviewed and are negative. Physical Exam     Vitals:    06/13/18 0549   BP: 150/83   Pulse: 95   Resp: 18   Temp: 97.9 °F (36.6 °C)   SpO2: 98%   Weight: 142.9 kg (315 lb)   Height: 5' 2\" (1.575 m)     Physical Exam   Constitutional: She is oriented to person, place, and time. She appears well-developed and well-nourished. No distress. Morbidly obese female in no distress   HENT:   Head: Normocephalic and atraumatic. Eyes: Pupils are equal, round, and reactive to light. Neck: Neck supple. Cardiovascular: Normal rate, regular rhythm, S1 normal, S2 normal and normal heart sounds. Pulses:       Dorsalis pedis pulses are 2+ on the right side, and 2+ on the left side. Posterior tibial pulses are 2+ on the right side, and 2+ on the left side. Pulmonary/Chest: Breath sounds normal. No respiratory distress. She has no wheezes. She has no rales. She exhibits no tenderness. Abdominal: Soft. She exhibits no distension and no mass.  There is no tenderness. There is no guarding. Very morbid abdomen   Musculoskeletal: Normal range of motion. Right lower leg: She exhibits tenderness (mild) and edema (1+). Left lower leg: She exhibits tenderness (mild) and edema (1+). Some erythema to the distal aspect of each leg, redness is worse on the right leg with some area oozing of clear fluid. Both areas are warm to touch, mild tenderness to both legs   Neurological: She is alert and oriented to person, place, and time. No cranial nerve deficit. Skin: No rash noted. There is erythema (distal aspect of both legs). Psychiatric: She has a normal mood and affect. Her behavior is normal. Thought content normal.   Nursing note and vitals reviewed. Diagnostic Study Results     Labs -     No results found for this or any previous visit (from the past 12 hour(s)). Radiologic Studies -    No orders to display     CT Results  (Last 48 hours)    None        CXR Results  (Last 48 hours)    None            Medical Decision Making   I am the first provider for this patient. I reviewed the vital signs, available nursing notes, past medical history, past surgical history, family history and social history. Vital Signs-Reviewed the patient's vital signs. Pulse Oximetry Analysis - 98% on RA     Records Reviewed: Nursing Notes and Old Medical Records    Provider Notes (Medical Decision Making):     Procedures:  Procedures    MEDICATIONS GIVEN:  Medications - No data to display    ED Course:   6:00 AM   Initial assessment performed. The patients presenting problems have been discussed, and they are in agreement with the care plan formulated and outlined with them. I have encouraged them to ask questions as they arise throughout their visit. 6:30 AM  I reviewed pt's old medical records and this problem is not new. She has chronic lymphedema of both legs. She has been seen by wound care and vascular, Dr. Baljit Jo.  She was recommended life long compression stockings and pt rather noncompliant with this. She also is supposed to be follow up with wound care which she does not do. I had a long discussion with her about this and need for compliance. I gave her 2-3 days of Tramadol and emphasized follow up with PCP, wound care, and vascular. Diagnosis and Disposition     DISCHARGE NOTE:  6:36 AM  Ruddy Weathers's  results have been reviewed with her. She has been counseled regarding her diagnosis, treatment, and plan. She verbally conveys understanding and agreement of the signs, symptoms, diagnosis, treatment and prognosis and additionally agrees to follow up as discussed. She also agrees with the care-plan and conveys that all of her questions have been answered. I have also provided discharge instructions for her that include: educational information regarding their diagnosis and treatment, and list of reasons why they would want to return to the ED prior to their follow-up appointment, should her condition change. She has been provided with education for proper emergency department utilization. CLINICAL IMPRESSION:    1. Cellulitis of left lower extremity    2. Lymphedema of both lower extremities        PLAN:  1. D/C Home  2. Discharge Medication List as of 6/13/2018  6:36 AM      START taking these medications    Details   traMADol (ULTRAM) 50 mg tablet Take 1 Tab by mouth every six (6) hours as needed for Pain. Max Daily Amount: 200 mg., Print, Disp-12 Tab, R-0      cephALEXin (KEFLEX) 500 mg capsule Take 1 Cap by mouth four (4) times daily for 7 days. , Normal, Disp-28 Cap, R-0         CONTINUE these medications which have NOT CHANGED    Details   warfarin (COUMADIN) 3 mg tablet Take 1 Tab by mouth daily. , Normal, Disp-30 Tab, R-0      escitalopram oxalate (LEXAPRO) 10 mg tablet TAKE 1 TABLET (10 MG TOTAL) BY MOUTH DAILY. , Historical Med      ferrous sulfate 325 mg (65 mg iron) tablet Take 325 mg by mouth., Historical Med      metoprolol succinate (TOPROL XL) 25 mg XL tablet Take 25 mg by mouth two (2) times a day., Historical Med      cholecalciferol, vitamin D3, 2,000 unit tab Take 2,000 Units by mouth., Historical Med      albuterol (VENTOLIN HFA) 90 mcg/actuation inhaler VENTOLIN  (90 Base) MCG/ACT AERS, Historical Med      cyanocobalamin (VITAMIN B-12) 1,000 mcg sublingual tablet Take 1,000 mcg by mouth daily. , Historical Med           3. Follow-up Information     Follow up With Details Comments Contact Info Additional Information    Brian Reyes Call in 1 day for wound care follow up [address] [id number] [phone] Patients scheduled for OP Wound Care visits should enter the [facility], 2nd floor, [address] for check in. Jaja Kincaid DO Call in 1 day For primary care follow up [address] [facility]       Mayra Matthews MD Call in 1 day For vascular surgery follow up [address]       THE [facility] EMERGENCY DEPT  As needed, If symptoms worsen [address] [id number]  [phone]         _______________________________    Attestations: This note is prepared by Eilna Vidal, acting as Scribe for Anne Ceballos MD.    Anne Ceballos MD:  The scribe's documentation has been prepared under my direction and personally reviewed by me in its entirety.   I confirm that the note above accurately reflects all work, treatment, procedures, and medical decision making performed by me.  _______________________________

## 2018-06-13 NOTE — DISCHARGE INSTRUCTIONS
Cellulitis: Care Instructions  Your Care Instructions    Cellulitis is a skin infection. It often occurs after a break in the skin from a scrape, cut, bite, or puncture, or after a rash. The doctor has checked you carefully, but problems can develop later. If you notice any problems or new symptoms, get medical treatment right away. Follow-up care is a key part of your treatment and safety. Be sure to make and go to all appointments, and call your doctor if you are having problems. It's also a good idea to know your test results and keep a list of the medicines you take. How can you care for yourself at home? · Take your antibiotics as directed. Do not stop taking them just because you feel better. You need to take the full course of antibiotics. · Prop up the infected area on pillows to reduce pain and swelling. Try to keep the area above the level of your heart as often as you can. · If your doctor told you how to care for your wound, follow your doctor's instructions. If you did not get instructions, follow this general advice:  ¨ Wash the wound with clean water 2 times a day. Don't use hydrogen peroxide or alcohol, which can slow healing. ¨ You may cover the wound with a thin layer of petroleum jelly, such as Vaseline, and a nonstick bandage. ¨ Apply more petroleum jelly and replace the bandage as needed. · Be safe with medicines. Take pain medicines exactly as directed. ¨ If the doctor gave you a prescription medicine for pain, take it as prescribed. ¨ If you are not taking a prescription pain medicine, ask your doctor if you can take an over-the-counter medicine. To prevent cellulitis in the future  · Try to prevent cuts, scrapes, or other injuries to your skin. Cellulitis most often occurs where there is a break in the skin. · If you get a scrape, cut, mild burn, or bite, wash the wound with clean water as soon as you can to help avoid infection.  Don't use hydrogen peroxide or alcohol, which can slow healing. · If you have swelling in your legs (edema), support stockings and good skin care may help prevent leg sores and cellulitis. · Take care of your feet, especially if you have diabetes or other conditions that increase the risk of infection. Wear shoes and socks. Do not go barefoot. If you have athlete's foot or other skin problems on your feet, talk to your doctor about how to treat them. When should you call for help? Call your doctor now or seek immediate medical care if:  ? · You have signs that your infection is getting worse, such as:  ¨ Increased pain, swelling, warmth, or redness. ¨ Red streaks leading from the area. ¨ Pus draining from the area. ¨ A fever. ? · You get a rash. ? Watch closely for changes in your health, and be sure to contact your doctor if:  ? · You are not getting better after 1 day (24 hours). ? · You do not get better as expected. Where can you learn more? Go to http://bowen-grupo.info/. Pawan Petty in the search box to learn more about \"Cellulitis: Care Instructions. \"  Current as of: October 13, 2016  Content Version: 11.4  © 8887-3369 [employer]. Care instructions adapted under license by [employer] (which disclaims liability or warranty for this information). If you have questions about a medical condition or this instruction, always ask your healthcare professional. [place] [address] any warranty or liability for your use of this information. Lymphedema: Care Instructions  Your Care Instructions    Lymphedema is fluid that builds up in the arms or legs. It is often caused by surgery to remove lymph nodes during cancer treatment, especially breast cancer surgery, which can cause fluid to build up in the arm. It can happen after radiation treatment to an area that involves lymph nodes. It also can be caused by a fractured bone or surgery to fix a fracture.  And some medicines also can cause lymphedema. Some people get it for unknown reasons. Normally, lymph nodes trap bacteria and other substances as fluid flows through them. Then, the white cells in the body's defense, or immune, system can destroy the substances. But if there are few or no lymph nodes-or if the lymph system in an arm or leg has been damaged-fluid can build up in the affected arm or leg. You can take simple steps at home to help treat or prevent fluid buildup. Treatment may include raising the arm or leg to let gravity drain the fluid. You also can wear compression stockings or sleeves. Follow-up care is a key part of your treatment and safety. Be sure to make and go to all appointments, and call your doctor if you are having problems. It's also a good idea to know your test results and keep a list of the medicines you take. How can you care for yourself at home? · Wear a compression stocking or sleeve as your doctor suggests. It can help keep fluid from pooling in an arm or leg. Wear it during air travel. · Prop up the swollen arm or leg on a pillow anytime you sit or lie down. Try to keep it above the level of your heart. This will help reduce swelling. · Avoid crossing your legs if your legs are swollen. · Get some exercise on most days of the week. Increase the intensity of exercise slowly. Water aerobics can help reduce swelling by helping fluid move around. Wear your compression stocking or sleeve during exercise, but not during water exercise. · See a physical therapist. He or she can teach you how to do self-massage to help fluid move around. You also can learn what activities would be best for you. · Keep your feet clean and wear clean socks or stockings every day. Check your feet often for signs of infection, such as redness or heat. Do not walk barefoot. · If you have had lymph nodes removed from under your arm:  ¨ Do not have blood drawn from the arm on the side of the lymph node surgery.   ¨ Do not allow a blood pressure cuff to be placed on that arm. If you are in the hospital, make sure your nurse and other hospital staff know of your condition. ¨ Wear gloves when gardening or doing other activities that may lead to cuts on your fingers or hands. · If you have had lymph nodes removed from your groin:  ¨ Bathe your feet daily in lukewarm, not hot, water. Use a mild soap that has a moisturizer, or use a moisturizer separately. ¨ Check your feet for blisters or cuts. ¨ Wear comfortable and supportive shoes that fit properly. ¨ Wear the correct size of panty hose and stockings. Avoid garters or knee-high or thigh-high stockings. · Ask your doctor how to treat any cuts, scratches, insect bites, or other injuries that may occur. · Use sunscreen and insect repellent when outdoors to protect your skin from sunburn and insect bites. · Wear medical alert jewelry that says you have lymphedema. You can buy these at most drugstores and on the Internet. When should you call for help? Call your doctor now or seek immediate medical care if:  ? · You have signs of infection, such as:  ¨ Increased pain, swelling, warmth, or redness. ¨ Red streaks leading from the area. ¨ Pus draining from the area. ¨ A fever. ? Watch closely for changes in your health, and be sure to contact your doctor if:  ? · You have new or worse symptoms from lymphedema. ? · You do not get better as expected. Where can you learn more? Go to http://bowen-grupo.info/. Enter V398 in the search box to learn more about \"Lymphedema: Care Instructions. \"  Current as of: May 12, 2017  Content Version: 11.4  © 3339-4009 [employer]. Care instructions adapted under license by [employer] (which disclaims liability or warranty for this information).  If you have questions about a medical condition or this instruction, always ask your healthcare professional. Wilda Wynne disclaims any warranty or liability for your use of this information.

## 2018-06-18 ENCOUNTER — HOSPITAL ENCOUNTER (OUTPATIENT)
Dept: WOUND CARE | Age: 68
Discharge: HOME | End: 2018-06-18
Payer: MEDICARE

## 2018-06-18 PROCEDURE — 29580 STRAPPING UNNA BOOT: CPT

## 2018-06-18 NOTE — WOUND CARE
Physical Exam   Skin:           Unna boot applied bilaterally to lower extremities. No visible open wounds to left leg but patient has 2-3 + edema and states it occasionally drains.

## 2018-06-25 ENCOUNTER — HOSPITAL ENCOUNTER (OUTPATIENT)
Dept: WOUND CARE | Age: 68
Discharge: HOME | End: 2018-06-25
Payer: MEDICARE

## 2018-06-25 PROCEDURE — 29580 STRAPPING UNNA BOOT: CPT

## 2018-06-25 NOTE — WOUND CARE
Physical Exam   Skin:          Bilateral unna boots applied due to edema present bilaterally in lower legs.

## 2018-07-02 ENCOUNTER — HOSPITAL ENCOUNTER (OUTPATIENT)
Dept: WOUND CARE | Age: 68
Discharge: HOME | End: 2018-07-02
Payer: MEDICARE

## 2018-07-02 PROCEDURE — 29580 STRAPPING UNNA BOOT: CPT

## 2018-07-06 ENCOUNTER — HOSPITAL ENCOUNTER (OUTPATIENT)
Dept: WOUND CARE | Age: 68
Discharge: HOME | End: 2018-07-06
Payer: MEDICARE

## 2018-07-06 VITALS
RESPIRATION RATE: 18 BRPM | HEART RATE: 55 BPM | OXYGEN SATURATION: 99 % | TEMPERATURE: 97.8 F | DIASTOLIC BLOOD PRESSURE: 67 MMHG | SYSTOLIC BLOOD PRESSURE: 160 MMHG

## 2018-07-06 PROCEDURE — 29580 STRAPPING UNNA BOOT: CPT

## 2018-07-06 NOTE — WOUND CARE
V/O Dr. Segura Ip Apply Jaime Pancoast boot weekly, discharge once pt brings in her compression stockings

## 2018-07-12 ENCOUNTER — HOSPITAL ENCOUNTER (OUTPATIENT)
Dept: WOUND CARE | Age: 68
Discharge: HOME | End: 2018-07-12
Payer: MEDICARE

## 2018-07-12 VITALS
OXYGEN SATURATION: 97 % | HEART RATE: 72 BPM | TEMPERATURE: 98.1 F | SYSTOLIC BLOOD PRESSURE: 155 MMHG | RESPIRATION RATE: 16 BRPM | DIASTOLIC BLOOD PRESSURE: 68 MMHG

## 2018-07-12 PROCEDURE — 97602 WOUND(S) CARE NON-SELECTIVE: CPT

## 2018-07-12 NOTE — WOUND CARE
Physical Exam  
Skin:  
 
  
     Patient discharged from wound care services, all areas to bilateral lower legs have healed and farrow wraps were applied without difficulty, patient states that this seems like a process that she will be able to accomplish.

## 2018-11-01 ENCOUNTER — HOSPITAL ENCOUNTER (OUTPATIENT)
Dept: WOUND CARE | Age: 68
Discharge: HOME | End: 2018-11-01
Payer: MEDICARE

## 2018-11-01 PROBLEM — R60.9 VENOUS STASIS ULCER OF RIGHT LOWER LEG WITH EDEMA OF RIGHT LOWER LEG (HCC): Status: ACTIVE | Noted: 2018-11-01

## 2018-11-01 PROBLEM — L08.9 SKIN INFECTION: Status: ACTIVE | Noted: 2018-11-01

## 2018-11-01 PROBLEM — L97.919 VENOUS STASIS ULCER OF RIGHT LOWER LEG WITH EDEMA OF RIGHT LOWER LEG (HCC): Status: ACTIVE | Noted: 2018-11-01

## 2018-11-01 PROBLEM — I83.891 VARICOSE VEINS OF RIGHT LEG WITH EDEMA: Status: ACTIVE | Noted: 2018-11-01

## 2018-11-01 PROBLEM — I83.891 VENOUS STASIS ULCER OF RIGHT LOWER LEG WITH EDEMA OF RIGHT LOWER LEG (HCC): Status: ACTIVE | Noted: 2018-11-01

## 2018-11-01 PROBLEM — I83.019 VENOUS STASIS ULCER OF RIGHT LOWER LEG WITH EDEMA OF RIGHT LOWER LEG (HCC): Status: ACTIVE | Noted: 2018-11-01

## 2018-11-01 PROCEDURE — 29580 STRAPPING UNNA BOOT: CPT

## 2018-11-01 PROCEDURE — 87186 SC STD MICRODIL/AGAR DIL: CPT | Performed by: PODIATRIST

## 2018-11-01 PROCEDURE — 87075 CULTR BACTERIA EXCEPT BLOOD: CPT | Performed by: PODIATRIST

## 2018-11-01 PROCEDURE — 87077 CULTURE AEROBIC IDENTIFY: CPT | Performed by: PODIATRIST

## 2018-11-01 PROCEDURE — 87070 CULTURE OTHR SPECIMN AEROBIC: CPT | Performed by: PODIATRIST

## 2018-11-01 NOTE — WOUND CARE
Patient presents to wound clinic for: Bruce Ulloa is a 70 y.o. female who presents today as an initial patient to me for the following:  Chronic right leg ulceration. She was hospitalized at [facility] for leg ulceration and she was on IV antibiotics. She is no longer on antibiotics. No chills, fever or SOB. She takes care of her , who is disabled. This patient apparently use to be a patient here at the wound care center. Pertinent Medical History: 
Past Medical History:  
Diagnosis Date  Depression  Hypertension  Thromboembolus ([address] Past Surgical History:  
Procedure Laterality Date [address] UNLISTED  HX CHOLECYSTECTOMY  HX ORTHOPAEDIC Prior to Admission medications Medication Sig Start Date End Date Taking? Authorizing Provider  
traMADol (ULTRAM) 50 mg tablet Take 1 Tab by mouth every six (6) hours as needed for Pain. Max Daily Amount: 200 mg. 6/13/18   Jeannie Zafar MD  
warfarin (COUMADIN) 3 mg tablet Take 1 Tab by mouth daily. 10/31/17   Rosemary Morris PA-C  
escitalopram oxalate (LEXAPRO) 10 mg tablet TAKE 1 TABLET (10 MG TOTAL) BY MOUTH DAILY. 7/30/17   Provider, Historical  
ferrous sulfate 325 mg (65 mg iron) tablet Take 325 mg by mouth. 4/22/16   Provider, Historical  
metoprolol succinate (TOPROL XL) 25 mg XL tablet Take 25 mg by mouth two (2) times a day. 1/25/16   Provider, Historical  
cholecalciferol, vitamin D3, 2,000 unit tab Take 2,000 Units by mouth. Provider, Historical  
albuterol (VENTOLIN HFA) 90 mcg/actuation inhaler VENTOLIN  (90 Base) MCG/ACT AERS 1/25/16   Provider, Historical  
cyanocobalamin (VITAMIN B-12) 1,000 mcg sublingual tablet Take 1,000 mcg by mouth daily. Provider, Historical  
 
No Known Allergies Family History Problem Relation Age of Onset  Diabetes Mother  Hypertension Mother  Cancer Father  Heart Disease Father Social History Socioeconomic History  Marital status:  Spouse name: Not on file  Number of children: Not on file  Years of education: Not on file  Highest education level: Not on file Social Needs  Financial resource strain: Not on file  Food insecurity - worry: Not on file  Food insecurity - inability: Not on file  Transportation needs - medical: Not on file  Transportation needs - non-medical: Not on file Occupational History  Not on file Tobacco Use  Smoking status: Never Smoker  Smokeless tobacco: Never Used Substance and Sexual Activity  Alcohol use: Yes  Drug use: No  
 Sexual activity: Not Currently Other Topics Concern  Not on file Social History Narrative  Not on file There were no vitals filed for this visit. Wound Description: Wound Type: venous stasis wound posterior lateral leg/calf Indication: [Chronic >30days] Status: initial 
 Measurements: 
  Wound Length:  5cm Wound Width :  2cm Wound Depth :  0 Tissue Type:  
Epithelial: without necrosis Granulation: without necrosis Subcutaneous: [without necrosis] [facility] [place] Hahn Eschar: Arbusman Espinoza Tendon: [NA] Fascia: [NA] Muscle: [NA] Bone: [NA] Gangrene: [NA] Drainage: +bloody, serous discharge Debridement: [not required] Infection: [yes] Type: [Local] Signs and Symptoms: redness, mild odor, edema, calor, -celluliitis Recent Would Culture results: none Antibiotic:none, she finished IV anbiotics Edema: [yes] Status: initial 
 Edema is being managed with: starting today with unna boots Patient educated on the importance of edema control [Yes] Lower Extremity Circulation Signs and Symptoms: palpable pedal pulses Circulation study results: none reviewed nor ordered Compression: [yes, starting today Neuropathy:decreased sensation Offloading: NA 
  
Nicotine/Tobacco Use: unknown Educated on the importance of stopping nicotine use: [No) Nutrition: 
Status Obese Diabetic NA Other Labs:  
Ordered: aerobic/anaerobic wound cultures right leg today Plan: Patient was seen and evaluated today for right leg ulcer. Cultures taken and applied gentamycin ointment, adaptic and unna boot. F/u in 2 weeks.

## 2018-11-01 NOTE — DISCHARGE INSTRUCTIONS
Leave dressings in place until next visit    Patient to return for wound care in:  Weekly     PLEASE CONTACT OFFICE AS SOON AS POSSIBLE IF UNABLE TO MAKE THIS APPOINTMENT. Inspect your wounds, looking for signs of infection which may include the following:  Increase in redness  Red \"streaks\" from wound  Increase in swelling  Fever  Unusual odor  Change in the amount of wound drainage. Should you experience any significant changes in your wound(s) or have any questions regarding your home care instructions please contact the wound center or your home health company. If after regular business hours, please call your family doctor or local emergency room. Edema Control:   Elevate legs as much as possible. Avoid standing in one position for more than 10 minutes. Avoid setting with legs down. Do not cross legs while sitting. Off-Loading:     Frequent position changes. Do not cross legs while sitting. Shift weight every 20 minutes or more when sitting for prolonged periods of time.

## 2018-11-02 NOTE — WOUND CARE
11/01/18 1513 Wound Leg Lower Right Date First Assessed/Time First Assessed: 11/01/18 1512   POA: Yes  Wound Type: Venous  Location: Leg Lower  Orientation: Right DRESSING STATUS Other (comment) (no dressing ) Wound Length (cm) 5 cm Wound Width (cm) 2 cm Wound Depth (cm) 0.1 Wound Surface area (cm^2) 10 cm^2 Condition of Base Pink;Slough Condition of Edges Open Tissue Type Pink;Yellow Tissue Type Percent Pink 50 Tissue Type Percent Yellow 50 Drainage Amount  Moderate Drainage Color Serous Wound Odor None Periwound Skin Condition Erythema, blanchable Cleansing and Cleansing Agents  Dermal wound cleanser Dressing Type Applied Unna boot 
(cari, exudry, adaptic, gent ointment ) Procedure Tolerated Well

## 2018-11-04 LAB
BACTERIA SPEC CULT: ABNORMAL
GRAM STN SPEC: ABNORMAL
GRAM STN SPEC: ABNORMAL
SERVICE CMNT-IMP: ABNORMAL

## 2018-11-07 ENCOUNTER — HOSPITAL ENCOUNTER (OUTPATIENT)
Dept: WOUND CARE | Age: 68
Discharge: HOME | End: 2018-11-07
Payer: MEDICARE

## 2018-11-07 VITALS
OXYGEN SATURATION: 97 % | TEMPERATURE: 98.1 F | DIASTOLIC BLOOD PRESSURE: 60 MMHG | RESPIRATION RATE: 18 BRPM | SYSTOLIC BLOOD PRESSURE: 138 MMHG | HEART RATE: 52 BPM

## 2018-11-07 LAB
BACTERIA SPEC CULT: NORMAL
SERVICE CMNT-IMP: NORMAL

## 2018-11-07 PROCEDURE — 29580 STRAPPING UNNA BOOT: CPT

## 2018-11-07 NOTE — WOUND CARE
11/07/18 1100 Wound Leg Lower Right Date First Assessed/Time First Assessed: 11/01/18 1512   POA: Yes  Wound Type: Venous  Location: Leg Lower  Orientation: Right DRESSING STATUS Removed; Other (comment) (Patient stated she removed dressing to shower) Non-Pressure Injury Full thickness (subcut/muscle) Wound Length (cm) 5 cm Wound Width (cm) 5 cm Wound Depth (cm) 0.1 Wound Surface area (cm^2) 25 cm^2 Change in Wound Size % -150 Condition of Base Pink Condition of Edges Calloused; Open Tissue Type Pink Drainage Amount  Moderate Drainage Color Serosanguinous Wound Odor None Periwound Skin Condition Intact;Calloused Cleansing and Cleansing Agents  Other (comment); Soap and water (vashe) Dressing Type Applied Other (Comment) (Gentamycin ointment, adaptic, exu-dry, cari, unna boot, cob) Procedure Tolerated Well

## 2018-11-15 ENCOUNTER — HOSPITAL ENCOUNTER (OUTPATIENT)
Dept: WOUND CARE | Age: 68
Discharge: HOME | End: 2018-11-15
Payer: MEDICARE

## 2018-11-15 VITALS
HEART RATE: 66 BPM | OXYGEN SATURATION: 98 % | TEMPERATURE: 98.1 F | RESPIRATION RATE: 18 BRPM | DIASTOLIC BLOOD PRESSURE: 66 MMHG | SYSTOLIC BLOOD PRESSURE: 144 MMHG

## 2018-11-15 PROCEDURE — 29580 STRAPPING UNNA BOOT: CPT

## 2018-11-15 NOTE — WOUND CARE
11/15/18 1512 Wound Leg Lower Right Date First Assessed/Time First Assessed: 11/01/18 1512   POA: Yes  Wound Type: Venous  Location: Leg Lower  Orientation: Right DRESSING STATUS Removed Non-Pressure Injury Full thickness (subcut/muscle) Wound Length (cm) (clinically closed ) Condition of Base Epithelializing Condition of Edges Closed Epithelialization (%) 100 Drainage Amount  None Wound Odor None Periwound Skin Condition Intact Cleansing and Cleansing Agents  Other (comment) (emperatriz wipes) Dressing Type Applied Unna boot 
(gentamycin ointment) Procedure Tolerated Well

## 2018-11-15 NOTE — DISCHARGE INSTRUCTIONS
Leave dressings in place until next visit    Patient to return for wound care in: 7  Days    PLEASE CONTACT OFFICE AS SOON AS POSSIBLE IF UNABLE TO MAKE THIS APPOINTMENT. Inspect your wounds, looking for signs of infection which may include the following:  Increase in redness  Red \"streaks\" from wound  Increase in swelling  Fever  Unusual odor  Change in the amount of wound drainage. Should you experience any significant changes in your wound(s) or have any questions regarding your home care instructions please contact the wound center or your home health company. If after regular business hours, please call your family doctor or local emergency room. Edema Control:   Elevate legs as much as possible. Avoid standing in one position for more than 10 minutes. Avoid setting with legs down. Do not cross legs while sitting. Off-Loading:     Frequent position changes. Do not cross legs while sitting. Shift weight every 20 minutes or more when sitting for prolonged periods of time.

## 2018-11-21 ENCOUNTER — HOSPITAL ENCOUNTER (OUTPATIENT)
Dept: WOUND CARE | Age: 68
Discharge: HOME | End: 2018-11-21
Payer: MEDICARE

## 2018-11-21 VITALS
SYSTOLIC BLOOD PRESSURE: 137 MMHG | RESPIRATION RATE: 17 BRPM | OXYGEN SATURATION: 96 % | TEMPERATURE: 98 F | HEART RATE: 89 BPM | DIASTOLIC BLOOD PRESSURE: 67 MMHG

## 2018-11-21 PROCEDURE — 29580 STRAPPING UNNA BOOT: CPT

## 2018-11-21 NOTE — WOUND CARE
11/21/18 1123 Wound Leg Lower Right Date First Assessed/Time First Assessed: 11/01/18 1512   POA: Yes  Wound Type: Venous  Location: Leg Lower  Orientation: Right DRESSING STATUS Removed Wound Length (cm) (wound is clinically closed) Condition of Base Epithelializing Condition of Edges Closed Epithelialization (%) 100 Drainage Amount  None Wound Odor None Periwound Skin Condition Intact Cleansing and Cleansing Agents  (foam wash and barrier wipes) Dressing Type Applied Unna boot Procedure Tolerated Well

## 2018-11-28 ENCOUNTER — HOSPITAL ENCOUNTER (OUTPATIENT)
Dept: WOUND CARE | Age: 68
Discharge: HOME | End: 2018-11-28
Payer: MEDICARE

## 2018-11-28 VITALS
RESPIRATION RATE: 18 BRPM | SYSTOLIC BLOOD PRESSURE: 125 MMHG | DIASTOLIC BLOOD PRESSURE: 54 MMHG | OXYGEN SATURATION: 100 % | TEMPERATURE: 97.6 F | HEART RATE: 87 BPM

## 2018-11-28 PROCEDURE — 29580 STRAPPING UNNA BOOT: CPT

## 2018-11-28 NOTE — WOUND CARE
11/28/18 1137 Wound Leg Lower Right Date First Assessed/Time First Assessed: 11/01/18 1512   POA: Yes  Wound Type: Venous  Location: Leg Lower  Orientation: Right DRESSING STATUS Removed DRESSING TYPE Open to air Wound Length (cm) (clinically closed) Condition of Base Epithelializing Condition of Edges Closed Epithelialization (%) 100 Drainage Amount  None Wound Odor None Periwound Skin Condition Intact Cleansing and Cleansing Agents  Dermal wound cleanser Dressing Type Applied Unna boot Procedure Tolerated Well

## 2018-11-28 NOTE — DISCHARGE INSTRUCTIONS
Patient to return for wound care in:  on 12/6/2018 to see Dr. Jeremy Moreira for wound evaluation    PLEASE CONTACT OFFICE AS SOON AS POSSIBLE IF UNABLE TO MAKE THIS APPOINTMENT. Inspect your wounds, looking for signs of infection which may include the following:  Increase in redness  Red \"streaks\" from wound  Increase in swelling  Fever  Unusual odor  Change in the amount of wound drainage. Should you experience any significant changes in your wound(s) or have any questions regarding your home care instructions please contact the wound center or your home health company. If after regular business hours, please call your family doctor or local emergency room. Edema Control:   Elevate legs as much as possible. Avoid standing in one position for more than 10 minutes. Avoid setting with legs down. Do not cross legs while sitting. Off-Loading:     Frequent position changes. Do not cross legs while sitting. Shift weight every 20 minutes or more when sitting for prolonged periods of time.

## 2021-02-05 NOTE — ED TRIAGE NOTES
1)  Continue the levothyroxine 75 mcg daily.  2)  Check labs (TSH and Free T4) and have a clinic visit in July.     Thank you for choosing [facility]. It was a pleasure to see you for your office visit today.     If you have any questions or scheduling needs during regular office hours, please call our [place] clinic: [phone]   If urgent concerns arise after hours, you can call [phone] and ask to speak to the pediatric specialist on call.   If you need to schedule Radiology tests, please call: [phone]  My Chart messages are for routine communication and questions and are usually answered within 48-72 hours. If you have an urgent concern or require sooner response, please call us.  Outside lab and imaging results should be faxed to [phone].  If you go to a lab outside of [facility] we will not automatically get those results. You will need to ask to have them faxed.       If you had any blood work, imaging or other tests completed today:  Normal test results will be mailed to your home address in a letter.  Abnormal results will be communicated to you via phone call/letter.  Please allow up to 1-2 weeks for processing and interpretation of most lab work.       Pt believes she has cellulitis. Pt has reddened area to bilateral lower legs.